# Patient Record
Sex: FEMALE | Race: WHITE | NOT HISPANIC OR LATINO
[De-identification: names, ages, dates, MRNs, and addresses within clinical notes are randomized per-mention and may not be internally consistent; named-entity substitution may affect disease eponyms.]

---

## 2017-06-02 ENCOUNTER — APPOINTMENT (OUTPATIENT)
Dept: OPHTHALMOLOGY | Facility: CLINIC | Age: 82
End: 2017-06-02

## 2019-04-05 ENCOUNTER — APPOINTMENT (OUTPATIENT)
Dept: COLORECTAL SURGERY | Facility: CLINIC | Age: 84
End: 2019-04-05

## 2019-04-08 ENCOUNTER — APPOINTMENT (OUTPATIENT)
Dept: SURGICAL ONCOLOGY | Facility: CLINIC | Age: 84
End: 2019-04-08
Payer: MEDICARE

## 2019-04-08 VITALS
DIASTOLIC BLOOD PRESSURE: 70 MMHG | SYSTOLIC BLOOD PRESSURE: 146 MMHG | OXYGEN SATURATION: 98 % | RESPIRATION RATE: 16 BRPM | HEART RATE: 87 BPM | WEIGHT: 137 LBS | BODY MASS INDEX: 25.21 KG/M2 | HEIGHT: 62 IN

## 2019-04-08 DIAGNOSIS — K63.9 DISEASE OF INTESTINE, UNSPECIFIED: ICD-10-CM

## 2019-04-08 PROCEDURE — 99205 OFFICE O/P NEW HI 60 MIN: CPT

## 2019-04-08 RX ORDER — ROSUVASTATIN CALCIUM 5 MG/1
5 TABLET, FILM COATED ORAL
Qty: 90 | Refills: 0 | Status: ACTIVE | COMMUNITY
Start: 2019-03-14

## 2019-04-08 RX ORDER — HYDROCHLOROTHIAZIDE 25 MG/1
25 TABLET ORAL
Qty: 90 | Refills: 0 | Status: ACTIVE | COMMUNITY
Start: 2019-03-01

## 2019-04-08 RX ORDER — UMECLIDINIUM BROMIDE AND VILANTEROL TRIFENATATE 62.5; 25 UG/1; UG/1
62.5-25 POWDER RESPIRATORY (INHALATION)
Qty: 60 | Refills: 0 | Status: ACTIVE | COMMUNITY
Start: 2019-03-14

## 2019-04-08 RX ORDER — LATANOPROST/PF 0.005 %
0.01 DROPS OPHTHALMIC (EYE)
Qty: 75 | Refills: 0 | Status: ACTIVE | COMMUNITY
Start: 2018-09-20

## 2019-04-08 RX ORDER — LOSARTAN POTASSIUM 50 MG/1
50 TABLET, FILM COATED ORAL
Qty: 90 | Refills: 0 | Status: ACTIVE | COMMUNITY
Start: 2019-03-14

## 2019-04-08 NOTE — ASSESSMENT
[FreeTextEntry1] : I have reviewed the patient's medical records and diagnostic images during the time of this office visit, and I have made the following recommendation:Today we discussed the planned surgery O+R+B d/w patient and daughter  \par I have reviewed the pertinent imaging.\par \par \par I have reviewed the pertinent imaging.\par

## 2019-04-08 NOTE — CONSULT LETTER
[Consult Letter:] : I had the pleasure of evaluating your patient, [unfilled]. [Please see my note below.] : Please see my note below. [Sincerely,] : Sincerely, [Dear  ___] : Dear  [unfilled], [DrXavi  ___] : Dr. LABOY [DrXavi ___] : Dr. LABOY [FreeTextEntry2] : Anton Saul [FreeTextEntry1] : 89 year old female presents today for an initial consultation.  She was recently found to be anemic and was referred for a CT of the abdomen and pelvis on 3/21/19 which revealed at least 6 cm long segment of ascending colon extending to the hepatic flexure with wall thickening and luminal narrowing concerning for mass with extracolonic spread.  A right hepatic lesion and sclerotic area in the right iliac bone may represent metastatic disease. There was gallstone with possible thickening at the fundus which may be further evaluated with ultrasound. \par \par On 4/4/19 Dr. Abhijit Solitario performed a colonoscopy which revealed a infiltrative partially obstructing mass at the hepatic flexure at least 5 cm in length.  The mass was malignant appearing.  Pathology is pending.  \par \par Her past medical history includes COPD, HTN and HLD. She reports dyspnea on exertion (able to climb a flight of stairs most of the time) but no dyspnea at rest.  Prior surgeries include a unilateral corneal transplant.  She has a family history of colon cancer involving her father diagnosed in his 70's.  Delmy has never had a screening colonoscopy prior to this point. \par \par She denies abdominal pain, hematochezia, thin caliber stools or bowel habit changes.\par Today we discussed the planned surgery O+R+B d/w patient and daughter  \par \par PCP: Dr. Jose Armando Natarajan\par Referring MD/Vascular: Dr. Anton Saul\par GI: Dr. Abhijit Solitario [FreeTextEntry3] : Salbador Odonnell MD, FACS, FASCRS\par , Department of Surgery\par Director of the [facility]\par , Minimally Invasive/Robotic Cancer Surgery, Central & Eastern Divisions\par Division of Surgical Oncology \par \par \par \par Salbador Odonnell MD, FACS, FACS, FASCRS\par , Department of Surgery\par Director of the [facility]\par , Minimally Invasive/Robotic Cancer Surgery, Central & Eastern Divisions\par Division of Surgical Oncology\par \par

## 2019-04-08 NOTE — PHYSICAL EXAM
[Normal] : supple, no neck mass and thyroid not enlarged [Normal Neck Lymph Nodes] : normal neck lymph nodes  [Normal Supraclavicular Lymph Nodes] : normal supraclavicular lymph nodes [Normal] : oriented to person, place and time, with appropriate affect

## 2019-04-08 NOTE — HISTORY OF PRESENT ILLNESS
[de-identified] : 89 year old female presents today for an initial consultation.  She was recently found to be anemic and was referred for a CT of the abdomen and pelvis on 3/21/19 which revealed at least 6 cm long segment of ascending colon extending to the hepatic flexure with wall thickening and luminal narrowing concerning for mass with extracolonic spread.  A right hepatic lesion and sclerotic area in the right iliac bone may represent metastatic disease. There was gallstone with possible thickening at the fundus which may be further evaluated with ultrasound. \par \par On 4/4/19 Dr. Abhijit Solitario performed a colonoscopy which revealed a infiltrative partially obstructing mass at the hepatic flexure at least 5 cm in length.  The mass was malignant appearing.  Pathology is pending.  \par \par Her past medical history includes COPD, HTN and HLD. She reports dyspnea on exertion (able to climb a flight of stairs most of the time) but no dyspnea at rest.  Prior surgeries include a unilateral corneal transplant.  She has a family history of colon cancer involving her father diagnosed in his 70's.  Delmy has never had a screening colonoscopy prior to this point. \par \par She denies abdominal pain, hematochezia, thin caliber stools or bowel habit changes.  \par \par PCP: Dr. Jose Armando Natarajan\par Referring MD/Vascular: Dr. Anton Saul\par GI: Dr. Abhijit Solitario

## 2019-04-15 ENCOUNTER — OUTPATIENT (OUTPATIENT)
Dept: OUTPATIENT SERVICES | Facility: HOSPITAL | Age: 84
LOS: 1 days | End: 2019-04-15
Payer: MEDICARE

## 2019-04-15 VITALS
SYSTOLIC BLOOD PRESSURE: 160 MMHG | HEART RATE: 88 BPM | RESPIRATION RATE: 14 BRPM | OXYGEN SATURATION: 96 % | WEIGHT: 138.89 LBS | TEMPERATURE: 97 F | HEIGHT: 60.5 IN | DIASTOLIC BLOOD PRESSURE: 82 MMHG

## 2019-04-15 DIAGNOSIS — K63.9 DISEASE OF INTESTINE, UNSPECIFIED: ICD-10-CM

## 2019-04-15 DIAGNOSIS — Z98.89 OTHER SPECIFIED POSTPROCEDURAL STATES: Chronic | ICD-10-CM

## 2019-04-15 DIAGNOSIS — I10 ESSENTIAL (PRIMARY) HYPERTENSION: ICD-10-CM

## 2019-04-15 DIAGNOSIS — Z87.09 PERSONAL HISTORY OF OTHER DISEASES OF THE RESPIRATORY SYSTEM: ICD-10-CM

## 2019-04-15 LAB
ALBUMIN SERPL ELPH-MCNC: 4 G/DL — SIGNIFICANT CHANGE UP (ref 3.3–5)
ALP SERPL-CCNC: 71 U/L — SIGNIFICANT CHANGE UP (ref 40–120)
ALT FLD-CCNC: 15 U/L — SIGNIFICANT CHANGE UP (ref 4–33)
ANION GAP SERPL CALC-SCNC: 13 MMO/L — SIGNIFICANT CHANGE UP (ref 7–14)
APTT BLD: 57 SEC — HIGH (ref 27.5–36.3)
AST SERPL-CCNC: 19 U/L — SIGNIFICANT CHANGE UP (ref 4–32)
BILIRUB SERPL-MCNC: 0.6 MG/DL — SIGNIFICANT CHANGE UP (ref 0.2–1.2)
BLD GP AB SCN SERPL QL: NEGATIVE — SIGNIFICANT CHANGE UP
BUN SERPL-MCNC: 31 MG/DL — HIGH (ref 7–23)
CALCIUM SERPL-MCNC: 9.9 MG/DL — SIGNIFICANT CHANGE UP (ref 8.4–10.5)
CHLORIDE SERPL-SCNC: 105 MMOL/L — SIGNIFICANT CHANGE UP (ref 98–107)
CO2 SERPL-SCNC: 23 MMOL/L — SIGNIFICANT CHANGE UP (ref 22–31)
CREAT SERPL-MCNC: 0.84 MG/DL — SIGNIFICANT CHANGE UP (ref 0.5–1.3)
GLUCOSE SERPL-MCNC: 85 MG/DL — SIGNIFICANT CHANGE UP (ref 70–99)
HCT VFR BLD CALC: 28.7 % — LOW (ref 34.5–45)
HGB BLD-MCNC: 7.9 G/DL — LOW (ref 11.5–15.5)
INR BLD: 1.08 — SIGNIFICANT CHANGE UP (ref 0.88–1.17)
MCHC RBC-ENTMCNC: 18.3 PG — LOW (ref 27–34)
MCHC RBC-ENTMCNC: 27.5 % — LOW (ref 32–36)
MCV RBC AUTO: 66.6 FL — LOW (ref 80–100)
NRBC # FLD: 0 K/UL — SIGNIFICANT CHANGE UP (ref 0–0)
PLATELET # BLD AUTO: 320 K/UL — SIGNIFICANT CHANGE UP (ref 150–400)
PMV BLD: 11 FL — SIGNIFICANT CHANGE UP (ref 7–13)
POTASSIUM SERPL-MCNC: 4.5 MMOL/L — SIGNIFICANT CHANGE UP (ref 3.5–5.3)
POTASSIUM SERPL-SCNC: 4.5 MMOL/L — SIGNIFICANT CHANGE UP (ref 3.5–5.3)
PROT SERPL-MCNC: 6.6 G/DL — SIGNIFICANT CHANGE UP (ref 6–8.3)
PROTHROM AB SERPL-ACNC: 12 SEC — SIGNIFICANT CHANGE UP (ref 9.8–13.1)
RBC # BLD: 4.31 M/UL — SIGNIFICANT CHANGE UP (ref 3.8–5.2)
RBC # FLD: 20.3 % — HIGH (ref 10.3–14.5)
RH IG SCN BLD-IMP: POSITIVE — SIGNIFICANT CHANGE UP
SODIUM SERPL-SCNC: 141 MMOL/L — SIGNIFICANT CHANGE UP (ref 135–145)
WBC # BLD: 8.06 K/UL — SIGNIFICANT CHANGE UP (ref 3.8–10.5)
WBC # FLD AUTO: 8.06 K/UL — SIGNIFICANT CHANGE UP (ref 3.8–10.5)

## 2019-04-15 PROCEDURE — 93010 ELECTROCARDIOGRAM REPORT: CPT

## 2019-04-15 NOTE — H&P PST ADULT - NSICDXPASTSURGICALHX_GEN_ALL_CORE_FT
PAST SURGICAL HISTORY:  Bilateral cataracts     H/O blepharoplasty BILATERAL    S/P eye surgery s/p corneal transplant, right

## 2019-04-15 NOTE — H&P PST ADULT - HISTORY OF PRESENT ILLNESS
89 y/o female with COPD, HTN, HTN and Glaucoma presents to PST for preoperative evaluation with dx of disease of intestine. Pt presented to her PCP with anemia. She had a CT abdomen/ pelvis followed by a colonoscopy which confirmed obstructing colon mass and right hepatic lesion. Scheduled for Laparotomy Extended Right Colectomy, Possible Liver Resection or Radiofrequency Ablation on 4/23/2019. Pt reports bloating but denies abdominal pain at present.

## 2019-04-15 NOTE — H&P PST ADULT - NEGATIVE ENMT SYMPTOMS
no dysphagia/no vertigo/no ear pain/no gum bleeding/no nasal congestion/no post-nasal discharge/no tinnitus/no nose bleeds

## 2019-04-15 NOTE — H&P PST ADULT - NS MD HP INPLANTS MED DEV
Hearing aid/bilateral hearing aids, dental implants bilateral hearing aids, dental implants/Lens implant/Hearing aid

## 2019-04-15 NOTE — H&P PST ADULT - NSANTHOSAYNRD_GEN_A_CORE
No. KJ screening performed.  STOP BANG Legend: 0-2 = LOW Risk; 3-4 = INTERMEDIATE Risk; 5-8 = HIGH Risk

## 2019-04-15 NOTE — H&P PST ADULT - RS GEN PE MLT RESP DETAILS PC
respirations non-labored/airway patent/breath sounds equal/no chest wall tenderness/good air movement/clear to auscultation bilaterally

## 2019-04-15 NOTE — H&P PST ADULT - NSICDXPASTMEDICALHX_GEN_ALL_CORE_FT
PAST MEDICAL HISTORY:  Anemia     COPD (chronic obstructive pulmonary disease)     Disease of intestine     Glaucoma     Hearing deficit, bilateral     Hepatic lesion     HTN (hypertension)     Hyperlipidemia

## 2019-04-15 NOTE — H&P PST ADULT - NEGATIVE NEUROLOGICAL SYMPTOMS
no vertigo/no confusion/no headache/no focal seizures/no transient paralysis/no loss of consciousness/no weakness/no hemiparesis/no facial palsy/no generalized seizures/no tremors/no loss of sensation/no syncope

## 2019-04-15 NOTE — H&P PST ADULT - NSICDXPROBLEM_GEN_ALL_CORE_FT
PROBLEM DIAGNOSES  Problem: Disease of intestine  Assessment and Plan: Scheduled for Laparotomy Extended Right Colectomy, Possible Liver Resection or Radiofrequency Ablation on 4/23/2019.  Preop instructions given, pt verbalized understanding   Chlorhexidine wash and GI prophylaxis provided   Pt was instructed by her surgeon to obtain medical clearance preop   She will see her PCP today at 2:45pm  PST labs and EKG to be faxed to office    Problem: HTN (hypertension)  Assessment and Plan: Repeat /70  Pt to take Losartan AM of surgery with sips of water. She will hold HCTZ on day of surgery   Medical clearance pending     Problem: History of COPD  Assessment and Plan: Pt to use Anoro Inhaler on day of surgery for management of COPD   Copy of clearance requested in PST due to h/o COPD

## 2019-04-15 NOTE — H&P PST ADULT - NEGATIVE OPHTHALMOLOGIC SYMPTOMS
no photophobia/no lacrimation R/no pain R/no blurred vision L/no pain L/no lacrimation L/no blurred vision R/no diplopia

## 2019-04-17 ENCOUNTER — RX RENEWAL (OUTPATIENT)
Age: 84
End: 2019-04-17

## 2019-04-17 RX ORDER — SODIUM PICOSULFATE, MAGNESIUM OXIDE, AND ANHYDROUS CITRIC ACID 10; 3.5; 12 MG/160ML; G/160ML; G/160ML
10-3.5-12 MG-GM LIQUID ORAL
Qty: 1 | Refills: 0 | Status: ACTIVE | COMMUNITY
Start: 2019-04-17 | End: 1900-01-01

## 2019-04-18 ENCOUNTER — OUTPATIENT (OUTPATIENT)
Dept: OUTPATIENT SERVICES | Facility: HOSPITAL | Age: 84
LOS: 1 days | End: 2019-04-18
Payer: MEDICARE

## 2019-04-18 ENCOUNTER — OUTPATIENT (OUTPATIENT)
Dept: OUTPATIENT SERVICES | Facility: HOSPITAL | Age: 84
LOS: 1 days | Discharge: HOME | End: 2019-04-18

## 2019-04-18 ENCOUNTER — RESULT REVIEW (OUTPATIENT)
Age: 84
End: 2019-04-18

## 2019-04-18 ENCOUNTER — APPOINTMENT (OUTPATIENT)
Dept: HEMATOLOGY ONCOLOGY | Facility: CLINIC | Age: 84
End: 2019-04-18

## 2019-04-18 DIAGNOSIS — D64.9 ANEMIA, UNSPECIFIED: ICD-10-CM

## 2019-04-18 DIAGNOSIS — C18.9 MALIGNANT NEOPLASM OF COLON, UNSPECIFIED: ICD-10-CM

## 2019-04-18 DIAGNOSIS — Z98.89 OTHER SPECIFIED POSTPROCEDURAL STATES: Chronic | ICD-10-CM

## 2019-04-18 PROBLEM — K76.9 LIVER DISEASE, UNSPECIFIED: Chronic | Status: ACTIVE | Noted: 2019-04-15

## 2019-04-18 PROBLEM — H91.93 UNSPECIFIED HEARING LOSS, BILATERAL: Chronic | Status: ACTIVE | Noted: 2019-04-15

## 2019-04-18 PROBLEM — K63.9 DISEASE OF INTESTINE, UNSPECIFIED: Chronic | Status: ACTIVE | Noted: 2019-04-15

## 2019-04-18 LAB
BLD GP AB SCN SERPL QL: NEGATIVE — SIGNIFICANT CHANGE UP
HCT VFR BLD CALC: 27.3 % — LOW (ref 34.5–45)
HGB BLD-MCNC: 8.7 G/DL — LOW (ref 11.5–15.5)
MCHC RBC-ENTMCNC: 20.1 PG — LOW (ref 27–34)
MCHC RBC-ENTMCNC: 31.8 G/DL — LOW (ref 32–36)
MCV RBC AUTO: 63.1 FL — LOW (ref 80–100)
PLATELET # BLD AUTO: 326 K/UL — SIGNIFICANT CHANGE UP (ref 150–400)
RBC # BLD: 4.32 M/UL — SIGNIFICANT CHANGE UP (ref 3.8–5.2)
RBC # FLD: 17.6 % — HIGH (ref 10.3–14.5)
RH IG SCN BLD-IMP: POSITIVE — SIGNIFICANT CHANGE UP
RH IG SCN BLD-IMP: POSITIVE — SIGNIFICANT CHANGE UP
WBC # BLD: 11.3 K/UL — HIGH (ref 3.8–10.5)
WBC # FLD AUTO: 11.3 K/UL — HIGH (ref 3.8–10.5)

## 2019-04-19 ENCOUNTER — OUTPATIENT (OUTPATIENT)
Dept: OUTPATIENT SERVICES | Facility: HOSPITAL | Age: 84
LOS: 1 days | End: 2019-04-19
Payer: MEDICARE

## 2019-04-19 ENCOUNTER — RESULT REVIEW (OUTPATIENT)
Age: 84
End: 2019-04-19

## 2019-04-19 DIAGNOSIS — Z98.89 OTHER SPECIFIED POSTPROCEDURAL STATES: Chronic | ICD-10-CM

## 2019-04-19 DIAGNOSIS — K63.9 DISEASE OF INTESTINE, UNSPECIFIED: ICD-10-CM

## 2019-04-19 LAB — SURGICAL PATHOLOGY STUDY: SIGNIFICANT CHANGE UP

## 2019-04-19 PROCEDURE — 86923 COMPATIBILITY TEST ELECTRIC: CPT

## 2019-04-19 PROCEDURE — 86850 RBC ANTIBODY SCREEN: CPT

## 2019-04-19 PROCEDURE — 88321 CONSLTJ&REPRT SLD PREP ELSWR: CPT

## 2019-04-19 PROCEDURE — 86900 BLOOD TYPING SEROLOGIC ABO: CPT

## 2019-04-19 PROCEDURE — 86901 BLOOD TYPING SEROLOGIC RH(D): CPT

## 2019-04-20 ENCOUNTER — APPOINTMENT (OUTPATIENT)
Age: 84
End: 2019-04-20

## 2019-04-22 ENCOUNTER — TRANSCRIPTION ENCOUNTER (OUTPATIENT)
Age: 84
End: 2019-04-22

## 2019-04-22 DIAGNOSIS — Z51.89 ENCOUNTER FOR OTHER SPECIFIED AFTERCARE: ICD-10-CM

## 2019-04-22 NOTE — ASU PATIENT PROFILE, ADULT - PSH
Bilateral cataracts    H/O blepharoplasty  BILATERAL  S/P eye surgery  s/p corneal transplant, right

## 2019-04-22 NOTE — ASU PATIENT PROFILE, ADULT - PMH
Anemia    COPD (chronic obstructive pulmonary disease)    Disease of intestine    Glaucoma    Hearing deficit, bilateral    Hepatic lesion    HTN (hypertension)    Hyperlipidemia

## 2019-04-23 ENCOUNTER — INPATIENT (INPATIENT)
Facility: HOSPITAL | Age: 84
LOS: 7 days | Discharge: HOME HEALTH | End: 2019-05-01
Attending: SPECIALIST | Admitting: SPECIALIST
Payer: MEDICARE

## 2019-04-23 ENCOUNTER — APPOINTMENT (OUTPATIENT)
Dept: SURGICAL ONCOLOGY | Facility: HOSPITAL | Age: 84
End: 2019-04-23

## 2019-04-23 ENCOUNTER — RESULT REVIEW (OUTPATIENT)
Age: 84
End: 2019-04-23

## 2019-04-23 VITALS
RESPIRATION RATE: 14 BRPM | SYSTOLIC BLOOD PRESSURE: 157 MMHG | OXYGEN SATURATION: 98 % | WEIGHT: 138.89 LBS | DIASTOLIC BLOOD PRESSURE: 59 MMHG | HEART RATE: 81 BPM | HEIGHT: 60.5 IN | TEMPERATURE: 99 F

## 2019-04-23 DIAGNOSIS — D64.9 ANEMIA, UNSPECIFIED: ICD-10-CM

## 2019-04-23 DIAGNOSIS — K76.9 LIVER DISEASE, UNSPECIFIED: ICD-10-CM

## 2019-04-23 DIAGNOSIS — K63.9 DISEASE OF INTESTINE, UNSPECIFIED: ICD-10-CM

## 2019-04-23 DIAGNOSIS — E78.5 HYPERLIPIDEMIA, UNSPECIFIED: ICD-10-CM

## 2019-04-23 DIAGNOSIS — Z98.89 OTHER SPECIFIED POSTPROCEDURAL STATES: Chronic | ICD-10-CM

## 2019-04-23 LAB
ALBUMIN SERPL ELPH-MCNC: 3.8 G/DL — SIGNIFICANT CHANGE UP (ref 3.3–5)
ALP SERPL-CCNC: 69 U/L — SIGNIFICANT CHANGE UP (ref 40–120)
ALT FLD-CCNC: 53 U/L — HIGH (ref 4–33)
ANION GAP SERPL CALC-SCNC: 18 MMO/L — HIGH (ref 7–14)
AST SERPL-CCNC: 120 U/L — HIGH (ref 4–32)
BASE EXCESS BLDA CALC-SCNC: -1.2 MMOL/L — SIGNIFICANT CHANGE UP
BILIRUB DIRECT SERPL-MCNC: 0.3 MG/DL — HIGH (ref 0.1–0.2)
BILIRUB SERPL-MCNC: 1.2 MG/DL — SIGNIFICANT CHANGE UP (ref 0.2–1.2)
BLD GP AB SCN SERPL QL: NEGATIVE — SIGNIFICANT CHANGE UP
BUN SERPL-MCNC: 20 MG/DL — SIGNIFICANT CHANGE UP (ref 7–23)
CA-I BLDA-SCNC: 1.21 MMOL/L — SIGNIFICANT CHANGE UP (ref 1.15–1.29)
CALCIUM SERPL-MCNC: 9.4 MG/DL — SIGNIFICANT CHANGE UP (ref 8.4–10.5)
CHLORIDE SERPL-SCNC: 103 MMOL/L — SIGNIFICANT CHANGE UP (ref 98–107)
CO2 SERPL-SCNC: 21 MMOL/L — LOW (ref 22–31)
CREAT SERPL-MCNC: 0.83 MG/DL — SIGNIFICANT CHANGE UP (ref 0.5–1.3)
GLUCOSE BLDA-MCNC: 103 MG/DL — HIGH (ref 70–99)
GLUCOSE BLDC GLUCOMTR-MCNC: 84 MG/DL — SIGNIFICANT CHANGE UP (ref 70–99)
GLUCOSE SERPL-MCNC: 104 MG/DL — HIGH (ref 70–99)
HCO3 BLDA-SCNC: 23 MMOL/L — SIGNIFICANT CHANGE UP (ref 22–26)
HCT VFR BLD CALC: 31.7 % — LOW (ref 34.5–45)
HCT VFR BLDA CALC: 25.5 % — LOW (ref 34.5–46.5)
HGB BLD-MCNC: 9.2 G/DL — LOW (ref 11.5–15.5)
HGB BLDA-MCNC: 8.2 G/DL — LOW (ref 11.5–15.5)
MAGNESIUM SERPL-MCNC: 1.9 MG/DL — SIGNIFICANT CHANGE UP (ref 1.6–2.6)
MCHC RBC-ENTMCNC: 19.9 PG — LOW (ref 27–34)
MCHC RBC-ENTMCNC: 29 % — LOW (ref 32–36)
MCV RBC AUTO: 68.5 FL — LOW (ref 80–100)
NRBC # FLD: 0 K/UL — SIGNIFICANT CHANGE UP (ref 0–0)
PCO2 BLDA: 41 MMHG — SIGNIFICANT CHANGE UP (ref 32–48)
PH BLDA: 7.37 PH — SIGNIFICANT CHANGE UP (ref 7.35–7.45)
PHOSPHATE SERPL-MCNC: 3.5 MG/DL — SIGNIFICANT CHANGE UP (ref 2.5–4.5)
PLATELET # BLD AUTO: 279 K/UL — SIGNIFICANT CHANGE UP (ref 150–400)
PMV BLD: 10.3 FL — SIGNIFICANT CHANGE UP (ref 7–13)
PO2 BLDA: 234 MMHG — HIGH (ref 83–108)
POTASSIUM BLDA-SCNC: 3.7 MMOL/L — SIGNIFICANT CHANGE UP (ref 3.4–4.5)
POTASSIUM SERPL-MCNC: 3.8 MMOL/L — SIGNIFICANT CHANGE UP (ref 3.5–5.3)
POTASSIUM SERPL-SCNC: 3.8 MMOL/L — SIGNIFICANT CHANGE UP (ref 3.5–5.3)
PROT SERPL-MCNC: 6 G/DL — SIGNIFICANT CHANGE UP (ref 6–8.3)
RBC # BLD: 4.63 M/UL — SIGNIFICANT CHANGE UP (ref 3.8–5.2)
RBC # FLD: 22.8 % — HIGH (ref 10.3–14.5)
RH IG SCN BLD-IMP: POSITIVE — SIGNIFICANT CHANGE UP
SAO2 % BLDA: 99.7 % — HIGH (ref 95–99)
SODIUM BLDA-SCNC: 139 MMOL/L — SIGNIFICANT CHANGE UP (ref 136–146)
SODIUM SERPL-SCNC: 142 MMOL/L — SIGNIFICANT CHANGE UP (ref 135–145)
WBC # BLD: 15.62 K/UL — HIGH (ref 3.8–10.5)
WBC # FLD AUTO: 15.62 K/UL — HIGH (ref 3.8–10.5)

## 2019-04-23 PROCEDURE — 88331 PATH CONSLTJ SURG 1 BLK 1SPC: CPT | Mod: 26

## 2019-04-23 PROCEDURE — 88342 IMHCHEM/IMCYTCHM 1ST ANTB: CPT | Mod: 26

## 2019-04-23 PROCEDURE — 88307 TISSUE EXAM BY PATHOLOGIST: CPT | Mod: 26

## 2019-04-23 PROCEDURE — 47100 WEDGE BIOPSY OF LIVER: CPT

## 2019-04-23 PROCEDURE — 88309 TISSUE EXAM BY PATHOLOGIST: CPT | Mod: 26

## 2019-04-23 PROCEDURE — 51702 INSERT TEMP BLADDER CATH: CPT | Mod: 59

## 2019-04-23 PROCEDURE — 38747 REMOVE ABDOMINAL LYMPH NODES: CPT | Mod: 59

## 2019-04-23 PROCEDURE — 44140 PARTIAL REMOVAL OF COLON: CPT

## 2019-04-23 PROCEDURE — 88341 IMHCHEM/IMCYTCHM EA ADD ANTB: CPT | Mod: 26

## 2019-04-23 RX ORDER — HEPARIN SODIUM 5000 [USP'U]/ML
5000 INJECTION INTRAVENOUS; SUBCUTANEOUS EVERY 8 HOURS
Qty: 0 | Refills: 0 | Status: DISCONTINUED | OUTPATIENT
Start: 2019-04-23 | End: 2019-04-23

## 2019-04-23 RX ORDER — NALOXONE HYDROCHLORIDE 4 MG/.1ML
0.1 SPRAY NASAL
Qty: 0 | Refills: 0 | Status: DISCONTINUED | OUTPATIENT
Start: 2019-04-23 | End: 2019-04-26

## 2019-04-23 RX ORDER — ONDANSETRON 8 MG/1
4 TABLET, FILM COATED ORAL ONCE
Qty: 0 | Refills: 0 | Status: DISCONTINUED | OUTPATIENT
Start: 2019-04-23 | End: 2019-04-29

## 2019-04-23 RX ORDER — ACETAMINOPHEN 500 MG
1000 TABLET ORAL ONCE
Qty: 0 | Refills: 0 | Status: COMPLETED | OUTPATIENT
Start: 2019-04-24 | End: 2019-04-24

## 2019-04-23 RX ORDER — ROSUVASTATIN CALCIUM 5 MG/1
1 TABLET ORAL
Qty: 0 | Refills: 0 | COMMUNITY

## 2019-04-23 RX ORDER — ONDANSETRON 8 MG/1
4 TABLET, FILM COATED ORAL EVERY 6 HOURS
Qty: 0 | Refills: 0 | Status: DISCONTINUED | OUTPATIENT
Start: 2019-04-23 | End: 2019-04-26

## 2019-04-23 RX ORDER — ONDANSETRON 8 MG/1
4 TABLET, FILM COATED ORAL EVERY 6 HOURS
Qty: 0 | Refills: 0 | Status: DISCONTINUED | OUTPATIENT
Start: 2019-04-23 | End: 2019-04-29

## 2019-04-23 RX ORDER — HEPARIN SODIUM 5000 [USP'U]/ML
5000 INJECTION INTRAVENOUS; SUBCUTANEOUS EVERY 12 HOURS
Qty: 0 | Refills: 0 | Status: DISCONTINUED | OUTPATIENT
Start: 2019-04-23 | End: 2019-04-27

## 2019-04-23 RX ORDER — IPRATROPIUM/ALBUTEROL SULFATE 18-103MCG
3 AEROSOL WITH ADAPTER (GRAM) INHALATION EVERY 6 HOURS
Qty: 0 | Refills: 0 | Status: DISCONTINUED | OUTPATIENT
Start: 2019-04-23 | End: 2019-04-25

## 2019-04-23 RX ORDER — SODIUM CHLORIDE 9 MG/ML
1000 INJECTION, SOLUTION INTRAVENOUS
Qty: 0 | Refills: 0 | Status: DISCONTINUED | OUTPATIENT
Start: 2019-04-23 | End: 2019-04-23

## 2019-04-23 RX ORDER — ACETAMINOPHEN 500 MG
1000 TABLET ORAL ONCE
Qty: 0 | Refills: 0 | Status: COMPLETED | OUTPATIENT
Start: 2019-04-23 | End: 2019-04-23

## 2019-04-23 RX ORDER — LATANOPROST 0.05 MG/ML
1 SOLUTION/ DROPS OPHTHALMIC; TOPICAL
Qty: 0 | Refills: 0 | COMMUNITY

## 2019-04-23 RX ORDER — HYDROMORPHONE HYDROCHLORIDE 2 MG/ML
0.25 INJECTION INTRAMUSCULAR; INTRAVENOUS; SUBCUTANEOUS
Qty: 0 | Refills: 0 | Status: DISCONTINUED | OUTPATIENT
Start: 2019-04-23 | End: 2019-04-24

## 2019-04-23 RX ORDER — LOSARTAN POTASSIUM 100 MG/1
1 TABLET, FILM COATED ORAL
Qty: 0 | Refills: 0 | COMMUNITY

## 2019-04-23 RX ORDER — LATANOPROST 0.05 MG/ML
1 SOLUTION/ DROPS OPHTHALMIC; TOPICAL AT BEDTIME
Qty: 0 | Refills: 0 | Status: DISCONTINUED | OUTPATIENT
Start: 2019-04-23 | End: 2019-05-01

## 2019-04-23 RX ORDER — HYDROCHLOROTHIAZIDE 25 MG
25 TABLET ORAL DAILY
Qty: 0 | Refills: 0 | Status: DISCONTINUED | OUTPATIENT
Start: 2019-04-23 | End: 2019-05-01

## 2019-04-23 RX ORDER — SODIUM CHLORIDE 9 MG/ML
3 INJECTION INTRAMUSCULAR; INTRAVENOUS; SUBCUTANEOUS EVERY 8 HOURS
Qty: 0 | Refills: 0 | Status: DISCONTINUED | OUTPATIENT
Start: 2019-04-23 | End: 2019-04-23

## 2019-04-23 RX ORDER — ATORVASTATIN CALCIUM 80 MG/1
20 TABLET, FILM COATED ORAL AT BEDTIME
Qty: 0 | Refills: 0 | Status: DISCONTINUED | OUTPATIENT
Start: 2019-04-23 | End: 2019-05-01

## 2019-04-23 RX ORDER — LOSARTAN POTASSIUM 100 MG/1
50 TABLET, FILM COATED ORAL DAILY
Qty: 0 | Refills: 0 | Status: DISCONTINUED | OUTPATIENT
Start: 2019-04-23 | End: 2019-05-01

## 2019-04-23 RX ORDER — HYDROMORPHONE HYDROCHLORIDE 2 MG/ML
0.5 INJECTION INTRAMUSCULAR; INTRAVENOUS; SUBCUTANEOUS
Qty: 0 | Refills: 0 | Status: DISCONTINUED | OUTPATIENT
Start: 2019-04-23 | End: 2019-04-24

## 2019-04-23 RX ORDER — SODIUM CHLORIDE 9 MG/ML
1000 INJECTION, SOLUTION INTRAVENOUS
Qty: 0 | Refills: 0 | Status: DISCONTINUED | OUTPATIENT
Start: 2019-04-23 | End: 2019-04-24

## 2019-04-23 RX ORDER — UMECLIDINIUM BROMIDE AND VILANTEROL TRIFENATATE 62.5; 25 UG/1; UG/1
1 POWDER RESPIRATORY (INHALATION)
Qty: 0 | Refills: 0 | COMMUNITY

## 2019-04-23 RX ADMIN — HYDROMORPHONE HYDROCHLORIDE 0.5 MILLIGRAM(S): 2 INJECTION INTRAMUSCULAR; INTRAVENOUS; SUBCUTANEOUS at 18:34

## 2019-04-23 RX ADMIN — Medication 400 MILLIGRAM(S): at 22:05

## 2019-04-23 RX ADMIN — HYDROMORPHONE HYDROCHLORIDE 0.5 MILLIGRAM(S): 2 INJECTION INTRAMUSCULAR; INTRAVENOUS; SUBCUTANEOUS at 18:45

## 2019-04-23 RX ADMIN — Medication 1000 MILLIGRAM(S): at 22:50

## 2019-04-23 RX ADMIN — Medication 3 MILLILITER(S): at 22:05

## 2019-04-23 RX ADMIN — LATANOPROST 1 DROP(S): 0.05 SOLUTION/ DROPS OPHTHALMIC; TOPICAL at 22:06

## 2019-04-23 RX ADMIN — SODIUM CHLORIDE 30 MILLILITER(S): 9 INJECTION, SOLUTION INTRAVENOUS at 11:30

## 2019-04-23 RX ADMIN — ATORVASTATIN CALCIUM 20 MILLIGRAM(S): 80 TABLET, FILM COATED ORAL at 22:05

## 2019-04-23 NOTE — CHART NOTE - NSCHARTNOTEFT_GEN_A_CORE
SURGICAL POST-OP CHECK NOTE:    Procedure: Microwave ablation, mass, liver   Open resection of ascending colon extended right colectomy    Subjective: Patient feels well since surgery, endorses some abdominal bloating, pain well controlled     Vital Signs Last 24 Hrs  T(C): 36.6 (23 Apr 2019 16:30), Max: 37.1 (23 Apr 2019 10:24)  T(F): 97.8 (23 Apr 2019 16:30), Max: 98.7 (23 Apr 2019 10:24)  HR: 80 (23 Apr 2019 17:15) (73 - 81)  BP: 140/55 (23 Apr 2019 17:15) (126/52 - 157/59)  BP(mean): 72 (23 Apr 2019 17:15) (64 - 76)  RR: 20 (23 Apr 2019 17:15) (14 - 20)  SpO2: 98% (23 Apr 2019 17:15) (94% - 98%)  I&O's Summary                          9.2    15.62 )-----------( 279      ( 23 Apr 2019 18:23 )             31.7     04-23    142  |  103  |  20  ----------------------------<  104<H>  3.8   |  21<L>  |  0.83    Ca    9.4      23 Apr 2019 17:40  Phos  3.5     04-23  Mg     1.9     04-23    TPro  6.0  /  Alb  3.8  /  TBili  1.2  /  DBili  0.3<H>  /  AST  120<H>  /  ALT  53<H>  /  AlkPhos  69  04-23       PHYSICAL EXAM:  Gen: NAD  Resp: Nonlabored  Abdomen: Dressing c/d/i, abdomen soft, nt, nd      A/P: 89yoF s/p Microwave ablation, mass, liver ,Open resection of ascending colon extended right colectomy    Neuro: Pain control  Resp: IS  GI: ADAT  MSK: WBAT, PT/OT  Heme: DVT PPX

## 2019-04-23 NOTE — ASU PREOP CHECKLIST - SELECT TESTS ORDERED
Type and Screen/CBC/CMP/PT/PTT/INR/EKG fs 84  T&S sent/EKG/CMP/INR/CBC/Type and Screen/PT/PTT/POCT Blood Glucose

## 2019-04-23 NOTE — BRIEF OPERATIVE NOTE - NSICDXBRIEFPROCEDURE_GEN_ALL_CORE_FT
PROCEDURES:  Microwave ablation, mass, liver 23-Apr-2019 15:58:05  Damian Lira  Open resection of ascending colon 23-Apr-2019 15:57:25 extended right colectomy Damian Lira

## 2019-04-23 NOTE — BRIEF OPERATIVE NOTE - OPERATION/FINDINGS
midline laparotomy, mass at hepatic flexure.  Extended right colectomy with primary stapled anastomosis.  liver lesion noted at periphery in right lower lobe of liver, cystic.  Ablated with microwave.  Biopsy taken and sent for pathology.

## 2019-04-23 NOTE — PROGRESS NOTE ADULT - SUBJECTIVE AND OBJECTIVE BOX
Subjective: Patient is a 89y old  Female who presents with a chief complaint weight loss anemia i performed a colonoscopy  found irond eficency anemia obstructing tumor hepatic flexure for preop today     PAST MEDICAL & SURGICAL HISTORY:  Anemia  Hearing deficit, bilateral  Hepatic lesion  Disease of intestine  HTN (hypertension)  Hyperlipidemia  Glaucoma  COPD (chronic obstructive pulmonary disease)  H/O blepharoplasty: BILATERAL  S/P eye surgery: s/p corneal transplant, right  Bilateral cataracts      Allergies    No Known Allergies    Intolerances        MEDICATIONS  (STANDING):  lactated ringers. 1000 milliLiter(s) (30 mL/Hr) IV Continuous <Continuous>  sodium chloride 0.9% lock flush 3 milliLiter(s) IV Push every 8 hours    MEDICATIONS  (PRN):      CONSTITUTIONAL: No fever,20lb some  fatigue  EYES: No eye pain, visual disturbances, or discharge  ENMT:  No difficulty hearing, tinnitus, vertigo; No sinus or throat pain  NECK: No pain or stiffness  BREASTS: No pain, masses, or nipple discharge  RESPIRATORY: No cough, wheezing, chills or hemoptysis; No shortness of breath  CARDIOVASCULAR: No chest pain, palpitations, dizziness, or leg swelling  GASTROINTESTINAL: No abdominal or epigastric pain. No nausea, vomiting, or hematemesis; No diarrhea or constipation. No melena or hematochezia.  GENITOURINARY: No dysuria, frequency, hematuria, or incontinence  NEUROLOGICAL: No headaches, memory loss, loss of strength, numbness, or tremors  SKIN: No itching, burning, rashes, or lesions   LYMPH NODES: No enlarged glands  ENDOCRINE: No heat or cold intolerance; No hair loss  MUSCULOSKELETAL: No joint pain or swelling; No muscle, back, or extremity pain  PSYCHIATRIC: No depression, anxiety, mood swings, or difficulty sleeping  HEME/LYMPH: No easy bruising, or bleeding gums  ALLERY AND IMMUNOLOGIC: No hives or eczema      PHYSICAL EXAM:    GENERAL: Comfortable, no acute distress pale   HEAD:  Normocephalic, atraumatic  EYES: EOMI, PERRLA  HEENT: Moist mucous membranes  NECK: Supple, No JVD  NERVOUS SYSTEM:  Alert & Oriented X3, Motor Strength 5/5 B/L upper and lower extremities  CHEST/LUNG: Clear to auscultation bilaterally  HEART: Regular rate and rhythm  ABDOMEN: Soft, Nontender,distended, Bowel sounds present  GENITOURINARY: Voiding, no palpable bladder  EXTREMITIES:   No clubbing, cyanosis, or edema  MUSCULOSKELTAL- No muscle tenderness, no joint tenderness  SKIN-no rash    guiac positive stools  p88         132/80 afebrile     cea 31  low iron 13   folate nl  iron deficiency anemia                 EKG:    Imaging Studies:    Impression / Plan: colon cancer anemia obstruction  for surgery today

## 2019-04-24 LAB
ALBUMIN SERPL ELPH-MCNC: 3.4 G/DL — SIGNIFICANT CHANGE UP (ref 3.3–5)
ALP SERPL-CCNC: 61 U/L — SIGNIFICANT CHANGE UP (ref 40–120)
ALT FLD-CCNC: 81 U/L — HIGH (ref 4–33)
ANION GAP SERPL CALC-SCNC: 18 MMO/L — HIGH (ref 7–14)
APTT BLD: 49.5 SEC — HIGH (ref 27.5–36.3)
AST SERPL-CCNC: 122 U/L — HIGH (ref 4–32)
BILIRUB DIRECT SERPL-MCNC: 0.2 MG/DL — SIGNIFICANT CHANGE UP (ref 0.1–0.2)
BILIRUB SERPL-MCNC: 0.9 MG/DL — SIGNIFICANT CHANGE UP (ref 0.2–1.2)
BUN SERPL-MCNC: 24 MG/DL — HIGH (ref 7–23)
CALCIUM SERPL-MCNC: 9.4 MG/DL — SIGNIFICANT CHANGE UP (ref 8.4–10.5)
CHLORIDE SERPL-SCNC: 104 MMOL/L — SIGNIFICANT CHANGE UP (ref 98–107)
CO2 SERPL-SCNC: 20 MMOL/L — LOW (ref 22–31)
CREAT SERPL-MCNC: 0.86 MG/DL — SIGNIFICANT CHANGE UP (ref 0.5–1.3)
GLUCOSE SERPL-MCNC: 146 MG/DL — HIGH (ref 70–99)
HCT VFR BLD CALC: 30.6 % — LOW (ref 34.5–45)
HGB BLD-MCNC: 8.7 G/DL — LOW (ref 11.5–15.5)
INR BLD: 1.22 — HIGH (ref 0.88–1.17)
MAGNESIUM SERPL-MCNC: 1.9 MG/DL — SIGNIFICANT CHANGE UP (ref 1.6–2.6)
MCHC RBC-ENTMCNC: 19.7 PG — LOW (ref 27–34)
MCHC RBC-ENTMCNC: 28.4 % — LOW (ref 32–36)
MCV RBC AUTO: 69.4 FL — LOW (ref 80–100)
NRBC # FLD: 0 K/UL — SIGNIFICANT CHANGE UP (ref 0–0)
PHOSPHATE SERPL-MCNC: 4.2 MG/DL — SIGNIFICANT CHANGE UP (ref 2.5–4.5)
PLATELET # BLD AUTO: 277 K/UL — SIGNIFICANT CHANGE UP (ref 150–400)
PMV BLD: 11.1 FL — SIGNIFICANT CHANGE UP (ref 7–13)
POTASSIUM SERPL-MCNC: 3.9 MMOL/L — SIGNIFICANT CHANGE UP (ref 3.5–5.3)
POTASSIUM SERPL-SCNC: 3.9 MMOL/L — SIGNIFICANT CHANGE UP (ref 3.5–5.3)
PROT SERPL-MCNC: 5.4 G/DL — LOW (ref 6–8.3)
PROTHROM AB SERPL-ACNC: 14 SEC — HIGH (ref 9.8–13.1)
RBC # BLD: 4.41 M/UL — SIGNIFICANT CHANGE UP (ref 3.8–5.2)
RBC # FLD: 22.6 % — HIGH (ref 10.3–14.5)
SODIUM SERPL-SCNC: 142 MMOL/L — SIGNIFICANT CHANGE UP (ref 135–145)
WBC # BLD: 18.91 K/UL — HIGH (ref 3.8–10.5)
WBC # FLD AUTO: 18.91 K/UL — HIGH (ref 3.8–10.5)

## 2019-04-24 RX ORDER — MAGNESIUM SULFATE 500 MG/ML
1 VIAL (ML) INJECTION ONCE
Qty: 0 | Refills: 0 | Status: COMPLETED | OUTPATIENT
Start: 2019-04-24 | End: 2019-04-24

## 2019-04-24 RX ORDER — DEXTROSE MONOHYDRATE, SODIUM CHLORIDE, AND POTASSIUM CHLORIDE 50; .745; 4.5 G/1000ML; G/1000ML; G/1000ML
1000 INJECTION, SOLUTION INTRAVENOUS
Qty: 0 | Refills: 0 | Status: DISCONTINUED | OUTPATIENT
Start: 2019-04-24 | End: 2019-04-29

## 2019-04-24 RX ORDER — HYDROMORPHONE HYDROCHLORIDE 2 MG/ML
0.5 INJECTION INTRAMUSCULAR; INTRAVENOUS; SUBCUTANEOUS
Qty: 0 | Refills: 0 | Status: DISCONTINUED | OUTPATIENT
Start: 2019-04-24 | End: 2019-04-26

## 2019-04-24 RX ORDER — SODIUM CHLORIDE 9 MG/ML
1000 INJECTION, SOLUTION INTRAVENOUS
Qty: 0 | Refills: 0 | Status: DISCONTINUED | OUTPATIENT
Start: 2019-04-24 | End: 2019-04-24

## 2019-04-24 RX ORDER — POTASSIUM CHLORIDE 20 MEQ
10 PACKET (EA) ORAL ONCE
Qty: 0 | Refills: 0 | Status: DISCONTINUED | OUTPATIENT
Start: 2019-04-24 | End: 2019-04-24

## 2019-04-24 RX ADMIN — Medication 100 GRAM(S): at 09:49

## 2019-04-24 RX ADMIN — HEPARIN SODIUM 5000 UNIT(S): 5000 INJECTION INTRAVENOUS; SUBCUTANEOUS at 18:10

## 2019-04-24 RX ADMIN — ONDANSETRON 4 MILLIGRAM(S): 8 TABLET, FILM COATED ORAL at 06:30

## 2019-04-24 RX ADMIN — DEXTROSE MONOHYDRATE, SODIUM CHLORIDE, AND POTASSIUM CHLORIDE 50 MILLILITER(S): 50; .745; 4.5 INJECTION, SOLUTION INTRAVENOUS at 10:06

## 2019-04-24 RX ADMIN — Medication 3 MILLILITER(S): at 09:33

## 2019-04-24 RX ADMIN — Medication 400 MILLIGRAM(S): at 05:05

## 2019-04-24 RX ADMIN — ATORVASTATIN CALCIUM 20 MILLIGRAM(S): 80 TABLET, FILM COATED ORAL at 21:06

## 2019-04-24 RX ADMIN — Medication 3 MILLILITER(S): at 04:28

## 2019-04-24 RX ADMIN — LOSARTAN POTASSIUM 50 MILLIGRAM(S): 100 TABLET, FILM COATED ORAL at 05:04

## 2019-04-24 RX ADMIN — HEPARIN SODIUM 5000 UNIT(S): 5000 INJECTION INTRAVENOUS; SUBCUTANEOUS at 05:05

## 2019-04-24 RX ADMIN — Medication 25 MILLIGRAM(S): at 05:04

## 2019-04-24 RX ADMIN — Medication 3 MILLILITER(S): at 21:20

## 2019-04-24 RX ADMIN — LATANOPROST 1 DROP(S): 0.05 SOLUTION/ DROPS OPHTHALMIC; TOPICAL at 21:06

## 2019-04-24 RX ADMIN — Medication 3 MILLILITER(S): at 16:24

## 2019-04-24 NOTE — PHYSICAL THERAPY INITIAL EVALUATION ADULT - PERTINENT HX OF CURRENT PROBLEM, REHAB EVAL
This is a 89F with colon cancer s/p extended right colectomy with primary stapled anastomosis and microwave ablation of liver lesion (4/23)

## 2019-04-24 NOTE — CONSULT NOTE ADULT - SUBJECTIVE AND OBJECTIVE BOX
89 y/o female with COPD, HTN, HTN and Glaucoma s/p colectomy w/ anastamosis an liver ablation       INTERVAL HPI/OVERNIGHT EVENTS:    Medications:MEDICATIONS  (STANDING):  ALBUTerol/ipratropium for Nebulization 3 milliLiter(s) Nebulizer every 6 hours  atorvastatin 20 milliGRAM(s) Oral at bedtime  dextrose 5% + sodium chloride 0.9% with potassium chloride 20 mEq/L 1000 milliLiter(s) (50 mL/Hr) IV Continuous <Continuous>  heparin  Injectable 5000 Unit(s) SubCutaneous every 12 hours  hydrochlorothiazide 25 milliGRAM(s) Oral daily  HYDROmorphone (10 MICROgram(s)/mL) + BUpivacaine 0.0625% in 0.9% Sodium Chloride PCEA 250 milliLiter(s) Epidural PCA Continuous  latanoprost 0.005% Ophthalmic Solution 1 Drop(s) Both EYES at bedtime  losartan 50 milliGRAM(s) Oral daily    MEDICATIONS  (PRN):  HYDROmorphone  Injectable 0.5 milliGRAM(s) IV Push every 3 hours PRN severe breakthrough pain  HYDROmorphone (10 MICROgram(s)/mL) + BUpivacaine 0.0625% in 0.9% Sodium Chloride PCEA Rescue Clinician  Bolus 4 milliLiter(s) Epidural every 15 minutes PRN for Pain Score greater than 6  naloxone Injectable 0.1 milliGRAM(s) IV Push every 3 minutes PRN For ANY of the following changes in patient status:  A. RR LESS THAN 10 breaths per minute, B. Oxygen saturation LESS THAN 90%, C. Sedation score of 6  naloxone Injectable 0.1 milliGRAM(s) IV Push every 3 minutes PRN For ANY of the following changes in patient status:  A. RR LESS THAN 10 breaths per minute, B. Oxygen saturation LESS THAN 90%, C. Sedation score of 6  ondansetron Injectable 4 milliGRAM(s) IV Push every 6 hours PRN Nausea  ondansetron Injectable 4 milliGRAM(s) IV Push once PRN Nausea and/or Vomiting  ondansetron Injectable 4 milliGRAM(s) IV Push every 6 hours PRN Nausea      Allergies: Allergies    No Known Allergies    Intolerances          FAMILY HISTORY:  FH: colon cancer: father        PAST MEDICAL & SURGICAL HISTORY:  Anemia  Hearing deficit, bilateral  Hepatic lesion  Disease of intestine  HTN (hypertension)  Hyperlipidemia  Glaucoma  COPD (chronic obstructive pulmonary disease)  H/O blepharoplasty: BILATERAL  S/P eye surgery: s/p corneal transplant, right  Bilateral cataracts      REVIEW OF SYSTEMS:  CONSTITUTIONAL: No fever, weight loss, or fatigue  EYES: No eye pain, visual disturbances, or discharge  ENMT:  No difficulty hearing, tinnitus, vertigo; No sinus or throat pain  NECK: No pain or stiffness  RESPIRATORY: No cough, wheezing, chills or hemoptysis; No shortness of breath  CARDIOVASCULAR: No chest pain, palpitations, dizziness, or leg swelling  GASTROINTESTINAL:abd pain better   GENITOURINARY: No dysuria, frequency, hematuria, or incontinence  NEUROLOGICAL: No headaches, memory loss, loss of strength, numbness, or tremors  SKIN: No itching, burning, rashes, or lesions         T(C): 37.1 (04-24-19 @ 08:36), Max: 37.1 (04-24-19 @ 08:36)  HR: 88 (04-24-19 @ 09:33) (73 - 98)  BP: 123/39 (04-24-19 @ 08:36) (114/50 - 151/58)  RR: 18 (04-24-19 @ 08:36) (12 - 20)  SpO2: 95% (04-24-19 @ 08:36) (94% - 100%)  Wt(kg): --Vital Signs Last 24 Hrs  T(C): 37.1 (24 Apr 2019 08:36), Max: 37.1 (24 Apr 2019 08:36)  T(F): 98.7 (24 Apr 2019 08:36), Max: 98.7 (24 Apr 2019 08:36)  HR: 88 (24 Apr 2019 09:33) (73 - 98)  BP: 123/39 (24 Apr 2019 08:36) (114/50 - 151/58)  BP(mean): 59 (24 Apr 2019 08:36) (59 - 82)  RR: 18 (24 Apr 2019 08:36) (12 - 20)  SpO2: 95% (24 Apr 2019 08:36) (94% - 100%)  I&O's Summary    23 Apr 2019 07:01  -  24 Apr 2019 07:00  --------------------------------------------------------  IN: 1300 mL / OUT: 835 mL / NET: 465 mL        PHYSICAL EXAM:  GENERAL: NAD, well-groomed, well-developed  HEAD:  Atraumatic, Normocephalic  EYES: EOMI, PERRLA, conjunctiva and sclera clear  ENMT: No tonsillar erythema, exudates, or enlargement; Moist mucous membranes, Good dentition, No lesions  NECK: Supple, No JVD, Normal thyroid  NERVOUS SYSTEM:  Alert & Oriented X3, ; Motor Strength 5/5 B/L upper and lower extremities; DTRs 2+ intact and symmetric  CHEST/LUNG: Clear to percussion bilaterally; No rales, rhonchi, wheezing, or rubs  HEART: Regular rate and rhythm; No murmurs, rubs, or gallops  ABDOMEN: Soft, tender no r/g  EXTREMITIES:  2+ Peripheral Pulses, No clubbing, cyanosis, or edema    Consultant(s) Notes Reviewed:  [x ] YES  [ ] NO  Care Discussed with Consultants/Other Providerscpk [ x] YES  [ ] NO    LABS:                    CBC Full  -  ( 24 Apr 2019 05:11 )  WBC Count : 18.91 K/uL  RBC Count : 4.41 M/uL  Hemoglobin : 8.7 g/dL  Hematocrit : 30.6 %  Platelet Count - Automated : 277 K/uL  Mean Cell Volume : 69.4 fL  Mean Cell Hemoglobin : 19.7 pg  Mean Cell Hemoglobin Concentration : 28.4 %  Auto Neutrophil # : x  Auto Lymphocyte # : x  Auto Monocyte # : x  Auto Eosinophil # : x  Auto Basophil # : x  Auto Neutrophil % : x  Auto Lymphocyte % : x  Auto Monocyte % : x  Auto Eosinophil % : x  Auto Basophil % : x      04-24    142  |  104  |  24<H>  ----------------------------<  146<H>  3.9   |  20<L>  |  0.86    Ca    9.4      24 Apr 2019 05:11  Phos  4.2     04-24  Mg     1.9     04-24    TPro  5.4<L>  /  Alb  3.4  /  TBili  0.9  /  DBili  0.2  /  AST  122<H>  /  ALT  81<H>  /  AlkPhos  61  04-24          PT/INR - ( 24 Apr 2019 08:58 )   PT: 14.0 SEC;   INR: 1.22          PTT - ( 24 Apr 2019 08:58 )  PTT:49.5 SEC  RADIOLOGY & ADDITIONAL TESTS:    Imaging Personally Reviewed:  [ ] YES  [ ] NO

## 2019-04-24 NOTE — PATIENT PROFILE ADULT - SURGICAL SITE INCISION
----- Message from Ck Kruger sent at 5/16/2018  9:44 AM CDT -----  Pt is requesting a call from nurse to discuss which hospital  is working at.        Please call pt back at [phone]   
Pt advised that Dr. Hernandez does not make rounds at the [facility]  
yes

## 2019-04-24 NOTE — PHYSICAL THERAPY INITIAL EVALUATION ADULT - GENERAL OBSERVATIONS, REHAB EVAL
Patient received semi supine in bed , (+) IV, (+) PCA pump , (+) khan, (+) hearing aid, (+) hard of hearing ,(+) 2LO2 via nasal cannula , daughter at bedside.

## 2019-04-24 NOTE — PROGRESS NOTE ADULT - SUBJECTIVE AND OBJECTIVE BOX
Surgery Progress Note      Subjective: Patient seen and examined. No acute events overnight.     T(C): 37.1 (04-24-19 @ 08:36), Max: 37.1 (04-23-19 @ 10:24)  HR: 82 (04-24-19 @ 08:36) (73 - 98)  BP: 123/39 (04-24-19 @ 08:36) (114/50 - 157/59)  RR: 18 (04-24-19 @ 08:36) (12 - 20)  SpO2: 95% (04-24-19 @ 08:36) (94% - 100%)      04-23-19 @ 07:01  -  04-24-19 @ 07:00  --------------------------------------------------------  IN: 1300 mL / OUT: 835 mL / NET: 465 mL        Physical Exam:   General: NAD  Abdomen: soft, appropriately tender, midline incision with louis    Labs:                        8.7    18.91 )-----------( 277      ( 24 Apr 2019 05:11 )             30.6     04-24    142  |  104  |  24<H>  ----------------------------<  146<H>  3.9   |  20<L>  |  0.86    Ca    9.4      24 Apr 2019 05:11  Phos  4.2     04-24  Mg     1.9     04-24    TPro  5.4<L>  /  Alb  3.4  /  TBili  0.9  /  DBili  0.2  /  AST  122<H>  /  ALT  81<H>  /  AlkPhos  61  04-24      Medications:     acetaminophen  IVPB .. 1000 milliGRAM(s) IV Intermittent once  ALBUTerol/ipratropium for Nebulization 3 milliLiter(s) Nebulizer every 6 hours  atorvastatin 20 milliGRAM(s) Oral at bedtime  dextrose 5% + sodium chloride 0.45%. 1000 milliLiter(s) IV Continuous <Continuous>  heparin  Injectable 5000 Unit(s) SubCutaneous every 12 hours  hydrochlorothiazide 25 milliGRAM(s) Oral daily  HYDROmorphone  Injectable 0.5 milliGRAM(s) IV Push every 3 hours PRN  HYDROmorphone (10 MICROgram(s)/mL) + BUpivacaine 0.0625% in 0.9% Sodium Chloride PCEA 250 milliLiter(s) Epidural PCA Continuous  HYDROmorphone (10 MICROgram(s)/mL) + BUpivacaine 0.0625% in 0.9% Sodium Chloride PCEA Rescue Clinician  Bolus 4 milliLiter(s) Epidural every 15 minutes PRN  latanoprost 0.005% Ophthalmic Solution 1 Drop(s) Both EYES at bedtime  losartan 50 milliGRAM(s) Oral daily  magnesium sulfate  IVPB 1 Gram(s) IV Intermittent once  naloxone Injectable 0.1 milliGRAM(s) IV Push every 3 minutes PRN  naloxone Injectable 0.1 milliGRAM(s) IV Push every 3 minutes PRN  ondansetron Injectable 4 milliGRAM(s) IV Push every 6 hours PRN  ondansetron Injectable 4 milliGRAM(s) IV Push once PRN  ondansetron Injectable 4 milliGRAM(s) IV Push every 6 hours PRN  potassium chloride  10 mEq/100 mL IVPB 10 milliEquivalent(s) IV Intermittent once      Radiographs: No new imaging

## 2019-04-24 NOTE — CONSULT NOTE ADULT - ASSESSMENT
89 y/o female with colon cancer s/p extended right colectomy with primary anastomosis and microwave ablation of liver lesion     - pain control  diet as per sx  leukocytosis likely reactive  oob  dvt proph  copd stable  htn stable  resume meds   analgesics

## 2019-04-24 NOTE — PROGRESS NOTE ADULT - ASSESSMENT
89F with colon cancer s/p extended right colectomy with primary stapled anastomosis and microwave ablation of liver lesion (4/23)    - pain control  - diet: CLD  - d/c bill, f/u [facility]    D Team  [id number]

## 2019-04-24 NOTE — PROGRESS NOTE ADULT - SUBJECTIVE AND OBJECTIVE BOX
Subjective: Patient is a 89y old  Female  with hypertension Copd hyperlipidemia who presents with a chief complaint weight loss anemia i performed a colonoscopy  found iron deficency anemia obstructing tumor hepatic flexure day 1 post op   PAST MEDICAL & SURGICAL HISTORY:  Anemia  Hearing deficit, bilateral  Hepatic lesion  Disease of intestine  HTN (hypertension)  Hyperlipidemia  Glaucoma  COPD (chronic obstructive pulmonary disease)  H/O blepharoplasty: BILATERAL  S/P eye surgery: s/p corneal transplant, right  Bilateral cataracts      Allergies    No Known Allergies    Intolerances        MEDICATIONS  (STANDING):  lactated ringers. 1000 milliLiter(s) (30 mL/Hr) IV Continuous <Continuous>  sodium chloride 0.9% lock flush 3 milliLiter(s) IV Push every 8 hours    MEDICATIONS  (PRN):      CONSTITUTIONAL: No fever,20lb some  fatigue  EYES: No eye pain, visual disturbances, or discharge  ENMT:  No difficulty hearing, tinnitus, vertigo; No sinus or throat pain  NECK: No pain or stiffness  BREASTS: No pain, masses, or nipple discharge  RESPIRATORY: No cough, wheezing, chills or hemoptysis; No shortness of breath  CARDIOVASCULAR: No chest pain, palpitations, dizziness, or leg swelling  GASTROINTESTINAL: No abdominal or epigastric pain. No nausea, vomiting, or hematemesis; No diarrhea or constipation. No melena or hematochezia.  GENITOURINARY: No dysuria, frequency, hematuria, or incontinence  NEUROLOGICAL: No headaches, memory loss, loss of strength, numbness, or tremors  SKIN: No itching, burning, rashes, or lesions   LYMPH NODES: No enlarged glands  ENDOCRINE: No heat or cold intolerance; No hair loss  MUSCULOSKELETAL: No joint pain or swelling; No muscle, back, or extremity pain  PSYCHIATRIC: No depression, anxiety, mood swings, or difficulty sleeping  HEME/LYMPH: No easy bruising, or bleeding gums  ALLERY AND IMMUNOLOGIC: No hives or eczema      PHYSICAL EXAM:    GENERAL: Comfortable, no acute distress pale NPO  HEAD:  Normocephalic, atraumatic  EYES: EOMI, PERRLA  HEENT: Moist mucous membranes  NECK: Supple, No JVD  NERVOUS SYSTEM:  Alert & Oriented X3, Motor Strength 5/5 B/L upper and lower extremities  CHEST/LUNG: Clear to auscultation bilaterally  HEART: Regular rate and rhythm  ABDOMEN: Soft, Nontender,distended, dressed  GENITOURINARY: Voiding, no palpable bladder  EXTREMITIES:   No clubbing, cyanosis, or edema  MUSCULOSKELTAL- No muscle tenderness, no joint tenderness  SKIN-no rash    guiac positive stools  p88         132/80 afebrile     cea 31  low iron 13   folate nl  iron deficiency anemia     Complete Blood Count (04.23.19 @ 18:23)    WBC Count: 15.62 K/uL    RBC Count: 4.63 M/uL    Hemoglobin: 9.2 g/dL    Hematocrit: 31.7 %    Mean Cell Volume: 68.5 fL    Mean Cell Hemoglobin: 19.9 pg    Mean Cell Hemoglobin Conc: 29.0 %    Red Cell Distrib Width: 22.8 %    Platelet Count - Automated: 279 K/uL    MPV: 10.3 fl    Nucleated RBC #: 0 K/uL    Basic Metabolic Panel w/Mg &amp; Inorg Phos (04.23.19 @ 17:40)    Calcium, Total Serum: 9.4 mg/dL    Phosphorus Level, Serum: 3.5 mg/dL    eGFR if : 71 mL/min    eGFR if Non : 62: The units for eGFR are ml/min/1.73m2 (normalized body  surface area). The eGFR is calculated from a serum  creatinine using the CKD-EPI equation. Other variables  required for calculation are race, age and sex. Among  patients with chronic kidney disease (CKD), the eGFR is  useful in determining the stage of disease according to  KDOQI CKD classification. All eGFR results are reported  numerically with the following interpretation.    GFR  (ml/min/1.73 m2)          W/KIDNEY DAMAGE    W/O KIDNEY DMG  ==========================================================  >= 90.......................Stage 1..............Normal  60-89.......................Stage 2...........Decreased GFR  30-59.......................Stage 3..............Stage 3  15-29.......................Stage 4..............Stage 4  < 15........................Stage 5..............Stage 5    Each stage of CKD assumes that the associated GFR level  has been in effect for at least 3 months. Determination of  stages one and two (with eGFR > 59ml/min/m2) requires  estimation of kidney damage for at least 3 months as  defined by structural or functional abnormalities.    Limitations: All estimates of GFR will be less accurate  for patients at extremes of muscle mass (including but  not limited to frail elderly, critically ill, or cancer  patients), those with unusual diets, and those with  conditions associated with reduced secretion or  extrarenal elimination of creatinine. The eGFR equation  is not recommended for use in patients with unstable  creatinine levels. mL/min    Sodium, Serum: 142 mmol/L    Potassium, Serum: 3.8 mmol/L    Chloride, Serum: 103 mmol/L    Carbon Dioxide, Serum: 21 mmol/L    Anion Gap, Serum: 18 mmo/L    Blood Urea Nitrogen, Serum: 20 mg/dL    Creatinine, Serum: 0.83 mg/dL    Glucose, Serum: 104 mg/dL    Magnesium, Serum: 1.9 mg/dL                EKG:    Imaging Studies:    Impression / Day 1 status post hemicolectomy for ca colon

## 2019-04-24 NOTE — PROGRESS NOTE ADULT - SUBJECTIVE AND OBJECTIVE BOX
Anesthesia Pain Management Service- Attending Addendum    SUBJECTIVE: Pt doing well with PCEA without problems reported.    Therapy:	  [ ] IV PCA	   [ X] Epidural           [ ] s/p Spinal Opoid              [ ] Postpartum infusion	  [ ] Patient controlled regional anesthesia (PCRA)    [ ] prn Analgesics    Allergies    No Known Allergies    Intolerances      MEDICATIONS  (STANDING):  ALBUTerol/ipratropium for Nebulization 3 milliLiter(s) Nebulizer every 6 hours  atorvastatin 20 milliGRAM(s) Oral at bedtime  dextrose 5% + sodium chloride 0.9% with potassium chloride 20 mEq/L 1000 milliLiter(s) (50 mL/Hr) IV Continuous <Continuous>  heparin  Injectable 5000 Unit(s) SubCutaneous every 12 hours  hydrochlorothiazide 25 milliGRAM(s) Oral daily  HYDROmorphone (10 MICROgram(s)/mL) + BUpivacaine 0.0625% in 0.9% Sodium Chloride PCEA 250 milliLiter(s) Epidural PCA Continuous  latanoprost 0.005% Ophthalmic Solution 1 Drop(s) Both EYES at bedtime  losartan 50 milliGRAM(s) Oral daily    MEDICATIONS  (PRN):  HYDROmorphone  Injectable 0.5 milliGRAM(s) IV Push every 3 hours PRN severe breakthrough pain  HYDROmorphone (10 MICROgram(s)/mL) + BUpivacaine 0.0625% in 0.9% Sodium Chloride PCEA Rescue Clinician  Bolus 4 milliLiter(s) Epidural every 15 minutes PRN for Pain Score greater than 6  naloxone Injectable 0.1 milliGRAM(s) IV Push every 3 minutes PRN For ANY of the following changes in patient status:  A. RR LESS THAN 10 breaths per minute, B. Oxygen saturation LESS THAN 90%, C. Sedation score of 6  naloxone Injectable 0.1 milliGRAM(s) IV Push every 3 minutes PRN For ANY of the following changes in patient status:  A. RR LESS THAN 10 breaths per minute, B. Oxygen saturation LESS THAN 90%, C. Sedation score of 6  ondansetron Injectable 4 milliGRAM(s) IV Push every 6 hours PRN Nausea  ondansetron Injectable 4 milliGRAM(s) IV Push once PRN Nausea and/or Vomiting  ondansetron Injectable 4 milliGRAM(s) IV Push every 6 hours PRN Nausea      OBJECTIVE:   [X] No new signs     [ ] Other:    Side Effects:  [X ] None			[ ] Other:    Assessment of Catheter Site:		[ X] Intact		[ ] Other:    ASSESSMENT/PLAN  [ X] Continue current therapy    [ ] Therapy changed to:    [ ] IV PCA       [ ] Epidural     [ ] prn Analgesics     Comments: Continue current PCEA settings.

## 2019-04-24 NOTE — PROGRESS NOTE ADULT - SUBJECTIVE AND OBJECTIVE BOX
Anesthesia Pain Management Service: Day 2__ of Epidural    SUBJECTIVE: Patient doing well with PCEA and no problems.  Pain Scale Score: 0/10  Refer to charted pain scores    THERAPY:  [x ] Epidural Bupivacaine 0.0625% and Hydromorphone  		[ X] 10 micrograms/mL	[ ] 5 micrograms/mL  [ ] Epidural Bupivacaine 0.0625% and Fentanyl - 2 micrograms/mL  [ ] Epidural Ropivacaine 0.1% plain – 1 mg/mL  [ ] Patient Controlled Regional Anesthesia (PCRA) Ropivacaine  		[ ] 0.2%			[ ] 0.1%    Demand dose __3_ lockout __15_ (minutes) Continuous Rate _4__ Total: __53.20__ ml used (in past 24 hours)      MEDICATIONS  (STANDING):  acetaminophen  IVPB .. 1000 milliGRAM(s) IV Intermittent once  ALBUTerol/ipratropium for Nebulization 3 milliLiter(s) Nebulizer every 6 hours  atorvastatin 20 milliGRAM(s) Oral at bedtime  dextrose 5% + sodium chloride 0.45%. 1000 milliLiter(s) (50 mL/Hr) IV Continuous <Continuous>  heparin  Injectable 5000 Unit(s) SubCutaneous every 12 hours  hydrochlorothiazide 25 milliGRAM(s) Oral daily  HYDROmorphone (10 MICROgram(s)/mL) + BUpivacaine 0.0625% in 0.9% Sodium Chloride PCEA 250 milliLiter(s) Epidural PCA Continuous  latanoprost 0.005% Ophthalmic Solution 1 Drop(s) Both EYES at bedtime  losartan 50 milliGRAM(s) Oral daily  magnesium sulfate  IVPB 1 Gram(s) IV Intermittent once  potassium chloride  10 mEq/100 mL IVPB 10 milliEquivalent(s) IV Intermittent once    MEDICATIONS  (PRN):  HYDROmorphone  Injectable 0.5 milliGRAM(s) IV Push every 3 hours PRN severe breakthrough pain  HYDROmorphone (10 MICROgram(s)/mL) + BUpivacaine 0.0625% in 0.9% Sodium Chloride PCEA Rescue Clinician  Bolus 4 milliLiter(s) Epidural every 15 minutes PRN for Pain Score greater than 6  naloxone Injectable 0.1 milliGRAM(s) IV Push every 3 minutes PRN For ANY of the following changes in patient status:  A. RR LESS THAN 10 breaths per minute, B. Oxygen saturation LESS THAN 90%, C. Sedation score of 6  naloxone Injectable 0.1 milliGRAM(s) IV Push every 3 minutes PRN For ANY of the following changes in patient status:  A. RR LESS THAN 10 breaths per minute, B. Oxygen saturation LESS THAN 90%, C. Sedation score of 6  ondansetron Injectable 4 milliGRAM(s) IV Push every 6 hours PRN Nausea  ondansetron Injectable 4 milliGRAM(s) IV Push once PRN Nausea and/or Vomiting  ondansetron Injectable 4 milliGRAM(s) IV Push every 6 hours PRN Nausea      OBJECTIVE: Patient is lying in bed, able to turn on her own, and comfortable.    Assessment of Catheter Site:	[ ] Left	[ ] Right  [x ] Epidural 	[ ] Femoral	      [ ] Saphenous   [ ] Supraclavicular   [ ] Other:    [x ] Dressing intact	[x ] Site non-tender	[ x] Site without erythema, discharge, edema  [x ] Epidural tubing and connection checked	[x] Gross neurological exam within normal limits  [ ] Catheter removed – tip intact		[ ] Afebrile  	[ ] Febrile: ___   [ X] see Temp under VS below)                          8.7    18.91 )-----------( 277      ( 24 Apr 2019 05:11 )             30.6     Vital Signs Last 24 Hrs  T(C): 37.1 (04-24-19 @ 08:36), Max: 37.1 (04-23-19 @ 10:24)  T(F): 98.7 (04-24-19 @ 08:36), Max: 98.7 (04-23-19 @ 10:24)  HR: 82 (04-24-19 @ 08:36) (73 - 98)  BP: 123/39 (04-24-19 @ 08:36) (114/50 - 157/59)  BP(mean): 59 (04-24-19 @ 08:36) (59 - 82)  RR: 18 (04-24-19 @ 08:36) (12 - 20)  SpO2: 95% (04-24-19 @ 08:36) (94% - 100%)      Sedation Score:	[x ] Alert	[ ] Drowsy	[ ] Arousable	[ ] Asleep	[ ] Unresponsive    Side Effects:	[x ] None	[ ] Nausea	[ ] Vomiting	[ ] Pruritus  		[ ] Weakness		[ ] Numbness	[ ] Other:    ASSESSMENT/ PLAN:    Therapy to  be:	[x ] Continue   [ ] Discontinued   [ ] Change to prn Analgesics    Documentation and Verification of current medications:  [ X ] Done	[ ] Not done, not eligible, reason:    Comments: Doing OK with epidural and may continue. Anesthesia Pain Management Service: Day 2__ of Epidural    SUBJECTIVE: Patient doing well with PCEA and no problems.  Pain Scale Score: 0/10  Refer to charted pain scores    THERAPY:  [x ] Epidural Bupivacaine 0.0625% and Hydromorphone  		[ X] 10 micrograms/mL	[ ] 5 micrograms/mL  [ ] Epidural Bupivacaine 0.0625% and Fentanyl - 2 micrograms/mL  [ ] Epidural Ropivacaine 0.1% plain – 1 mg/mL  [ ] Patient Controlled Regional Anesthesia (PCRA) Ropivacaine  		[ ] 0.2%			[ ] 0.1%    Demand dose __3_ lockout __15_ (minutes) Continuous Rate _4__ Total: __53.20__ ml used (in past 24 hours)      MEDICATIONS  (STANDING):  acetaminophen  IVPB .. 1000 milliGRAM(s) IV Intermittent once  ALBUTerol/ipratropium for Nebulization 3 milliLiter(s) Nebulizer every 6 hours  atorvastatin 20 milliGRAM(s) Oral at bedtime  dextrose 5% + sodium chloride 0.45%. 1000 milliLiter(s) (50 mL/Hr) IV Continuous <Continuous>  heparin  Injectable 5000 Unit(s) SubCutaneous every 12 hours  hydrochlorothiazide 25 milliGRAM(s) Oral daily  HYDROmorphone (10 MICROgram(s)/mL) + BUpivacaine 0.0625% in 0.9% Sodium Chloride PCEA 250 milliLiter(s) Epidural PCA Continuous  latanoprost 0.005% Ophthalmic Solution 1 Drop(s) Both EYES at bedtime  losartan 50 milliGRAM(s) Oral daily  magnesium sulfate  IVPB 1 Gram(s) IV Intermittent once  potassium chloride  10 mEq/100 mL IVPB 10 milliEquivalent(s) IV Intermittent once    MEDICATIONS  (PRN):  HYDROmorphone  Injectable 0.5 milliGRAM(s) IV Push every 3 hours PRN severe breakthrough pain  HYDROmorphone (10 MICROgram(s)/mL) + BUpivacaine 0.0625% in 0.9% Sodium Chloride PCEA Rescue Clinician  Bolus 4 milliLiter(s) Epidural every 15 minutes PRN for Pain Score greater than 6  naloxone Injectable 0.1 milliGRAM(s) IV Push every 3 minutes PRN For ANY of the following changes in patient status:  A. RR LESS THAN 10 breaths per minute, B. Oxygen saturation LESS THAN 90%, C. Sedation score of 6  naloxone Injectable 0.1 milliGRAM(s) IV Push every 3 minutes PRN For ANY of the following changes in patient status:  A. RR LESS THAN 10 breaths per minute, B. Oxygen saturation LESS THAN 90%, C. Sedation score of 6  ondansetron Injectable 4 milliGRAM(s) IV Push every 6 hours PRN Nausea  ondansetron Injectable 4 milliGRAM(s) IV Push once PRN Nausea and/or Vomiting  ondansetron Injectable 4 milliGRAM(s) IV Push every 6 hours PRN Nausea      OBJECTIVE: Patient is lying in bed, able to turn on her own, and comfortable.    Assessment of Catheter Site:	[ ] Left	[ ] Right  [x ] Epidural 	[ ] Femoral	      [ ] Saphenous   [ ] Supraclavicular   [ ] Other:    [x ] Dressing intact	[x ] Site non-tender	[ x] Site without erythema, discharge, edema  [x ] Epidural tubing and connection checked	[x] Gross neurological exam within normal limits  [ ] Catheter removed – tip intact		[ ] Afebrile  	[ ] Febrile: ___   [ X] see Temp under VS below)                          8.7    18.91 )-----------( 277      ( 24 Apr 2019 05:11 )             30.6     Vital Signs Last 24 Hrs  T(C): 37.1 (04-24-19 @ 08:36), Max: 37.1 (04-23-19 @ 10:24)  T(F): 98.7 (04-24-19 @ 08:36), Max: 98.7 (04-23-19 @ 10:24)  HR: 82 (04-24-19 @ 08:36) (73 - 98)  BP: 123/39 (04-24-19 @ 08:36) (114/50 - 157/59)  BP(mean): 59 (04-24-19 @ 08:36) (59 - 82)  RR: 18 (04-24-19 @ 08:36) (12 - 20)  SpO2: 95% (04-24-19 @ 08:36) (94% - 100%)      Sedation Score:	[x ] Alert	[ ] Drowsy	[ ] Arousable	[ ] Asleep	[ ] Unresponsive    Side Effects:	[x ] None	[ ] Nausea	[ ] Vomiting	[ ] Pruritus  		[ ] Weakness		[ ] Numbness	[ ] Other:    ASSESSMENT/ PLAN:    Therapy to  be:	[x ] Continue   [ ] Discontinued   [ ] Change to prn Analgesics    Documentation and Verification of current medications:  [ X ] Done	[ ] Not done, not eligible, reason:    Comments: Doing OK with epidural and may continue.  Coags ordered STAT, informed RN. Will follow-up. Anesthesia Pain Management Service: Day 2__ of Epidural    SUBJECTIVE: Patient doing well with PCEA and no problems.  Pain Scale Score: 0/10  Refer to charted pain scores    THERAPY:  [x ] Epidural Bupivacaine 0.0625% and Hydromorphone  		[ X] 10 micrograms/mL	[ ] 5 micrograms/mL  [ ] Epidural Bupivacaine 0.0625% and Fentanyl - 2 micrograms/mL  [ ] Epidural Ropivacaine 0.1% plain – 1 mg/mL  [ ] Patient Controlled Regional Anesthesia (PCRA) Ropivacaine  		[ ] 0.2%			[ ] 0.1%    Demand dose __3_ lockout __15_ (minutes) Continuous Rate _4__ Total: __53.20__ ml used (in past 24 hours)      MEDICATIONS  (STANDING):  acetaminophen  IVPB .. 1000 milliGRAM(s) IV Intermittent once  ALBUTerol/ipratropium for Nebulization 3 milliLiter(s) Nebulizer every 6 hours  atorvastatin 20 milliGRAM(s) Oral at bedtime  dextrose 5% + sodium chloride 0.45%. 1000 milliLiter(s) (50 mL/Hr) IV Continuous <Continuous>  heparin  Injectable 5000 Unit(s) SubCutaneous every 12 hours  hydrochlorothiazide 25 milliGRAM(s) Oral daily  HYDROmorphone (10 MICROgram(s)/mL) + BUpivacaine 0.0625% in 0.9% Sodium Chloride PCEA 250 milliLiter(s) Epidural PCA Continuous  latanoprost 0.005% Ophthalmic Solution 1 Drop(s) Both EYES at bedtime  losartan 50 milliGRAM(s) Oral daily  magnesium sulfate  IVPB 1 Gram(s) IV Intermittent once  potassium chloride  10 mEq/100 mL IVPB 10 milliEquivalent(s) IV Intermittent once    MEDICATIONS  (PRN):  HYDROmorphone  Injectable 0.5 milliGRAM(s) IV Push every 3 hours PRN severe breakthrough pain  HYDROmorphone (10 MICROgram(s)/mL) + BUpivacaine 0.0625% in 0.9% Sodium Chloride PCEA Rescue Clinician  Bolus 4 milliLiter(s) Epidural every 15 minutes PRN for Pain Score greater than 6  naloxone Injectable 0.1 milliGRAM(s) IV Push every 3 minutes PRN For ANY of the following changes in patient status:  A. RR LESS THAN 10 breaths per minute, B. Oxygen saturation LESS THAN 90%, C. Sedation score of 6  naloxone Injectable 0.1 milliGRAM(s) IV Push every 3 minutes PRN For ANY of the following changes in patient status:  A. RR LESS THAN 10 breaths per minute, B. Oxygen saturation LESS THAN 90%, C. Sedation score of 6  ondansetron Injectable 4 milliGRAM(s) IV Push every 6 hours PRN Nausea  ondansetron Injectable 4 milliGRAM(s) IV Push once PRN Nausea and/or Vomiting  ondansetron Injectable 4 milliGRAM(s) IV Push every 6 hours PRN Nausea      OBJECTIVE: Patient is lying in bed, able to turn on her own, and comfortable.    Assessment of Catheter Site:	[ ] Left	[ ] Right  [x ] Epidural 	[ ] Femoral	      [ ] Saphenous   [ ] Supraclavicular   [ ] Other:    [x ] Dressing intact	[x ] Site non-tender	[ x] Site without erythema, discharge, edema  [x ] Epidural tubing and connection checked	[x] Gross neurological exam within normal limits  [ ] Catheter removed – tip intact		[ ] Afebrile  	[ ] Febrile: ___   [ X] see Temp under VS below)                          8.7    18.91 )-----------( 277      ( 24 Apr 2019 05:11 )             30.6     Vital Signs Last 24 Hrs  T(C): 37.1 (04-24-19 @ 08:36), Max: 37.1 (04-23-19 @ 10:24)  T(F): 98.7 (04-24-19 @ 08:36), Max: 98.7 (04-23-19 @ 10:24)  HR: 82 (04-24-19 @ 08:36) (73 - 98)  BP: 123/39 (04-24-19 @ 08:36) (114/50 - 157/59)  BP(mean): 59 (04-24-19 @ 08:36) (59 - 82)  RR: 18 (04-24-19 @ 08:36) (12 - 20)  SpO2: 95% (04-24-19 @ 08:36) (94% - 100%)      Sedation Score:	[x ] Alert	[ ] Drowsy	[ ] Arousable	[ ] Asleep	[ ] Unresponsive    Side Effects:	[x ] None	[ ] Nausea	[ ] Vomiting	[ ] Pruritus  		[ ] Weakness		[ ] Numbness	[ ] Other:    ASSESSMENT/ PLAN:    Therapy to  be:	[x ] Continue   [ ] Discontinued   [ ] Change to prn Analgesics    Documentation and Verification of current medications:  [ X ] Done	[ ] Not done, not eligible, reason:    Comments: Doing OK with epidural and may continue.  Coags ordered STAT, informed RN. Will follow-up.    PTT elevated, discussed with pain attending. Plan is to have to get a hematology consult and mix study for elevated PTT.

## 2019-04-25 LAB
ALBUMIN SERPL ELPH-MCNC: 3 G/DL — LOW (ref 3.3–5)
ALP SERPL-CCNC: 56 U/L — SIGNIFICANT CHANGE UP (ref 40–120)
ALT FLD-CCNC: 80 U/L — HIGH (ref 4–33)
ANION GAP SERPL CALC-SCNC: 10 MMO/L — SIGNIFICANT CHANGE UP (ref 7–14)
APTT BLD: 47.9 SEC — HIGH (ref 27.5–36.3)
AST SERPL-CCNC: 86 U/L — HIGH (ref 4–32)
BILIRUB SERPL-MCNC: 0.5 MG/DL — SIGNIFICANT CHANGE UP (ref 0.2–1.2)
BUN SERPL-MCNC: 16 MG/DL — SIGNIFICANT CHANGE UP (ref 7–23)
CALCIUM SERPL-MCNC: 8.9 MG/DL — SIGNIFICANT CHANGE UP (ref 8.4–10.5)
CHLORIDE SERPL-SCNC: 104 MMOL/L — SIGNIFICANT CHANGE UP (ref 98–107)
CO2 SERPL-SCNC: 26 MMOL/L — SIGNIFICANT CHANGE UP (ref 22–31)
CREAT SERPL-MCNC: 0.73 MG/DL — SIGNIFICANT CHANGE UP (ref 0.5–1.3)
GLUCOSE SERPL-MCNC: 102 MG/DL — HIGH (ref 70–99)
HCT VFR BLD CALC: 30.4 % — LOW (ref 34.5–45)
HGB BLD-MCNC: 8.3 G/DL — LOW (ref 11.5–15.5)
INR BLD: 1.13 — SIGNIFICANT CHANGE UP (ref 0.88–1.17)
MAGNESIUM SERPL-MCNC: 2 MG/DL — SIGNIFICANT CHANGE UP (ref 1.6–2.6)
MCHC RBC-ENTMCNC: 19.6 PG — LOW (ref 27–34)
MCHC RBC-ENTMCNC: 27.3 % — LOW (ref 32–36)
MCV RBC AUTO: 71.9 FL — LOW (ref 80–100)
NRBC # FLD: 0 K/UL — SIGNIFICANT CHANGE UP (ref 0–0)
PHOSPHATE SERPL-MCNC: 2.5 MG/DL — SIGNIFICANT CHANGE UP (ref 2.5–4.5)
PLATELET # BLD AUTO: 206 K/UL — SIGNIFICANT CHANGE UP (ref 150–400)
PMV BLD: 10.4 FL — SIGNIFICANT CHANGE UP (ref 7–13)
POTASSIUM SERPL-MCNC: 3.7 MMOL/L — SIGNIFICANT CHANGE UP (ref 3.5–5.3)
POTASSIUM SERPL-SCNC: 3.7 MMOL/L — SIGNIFICANT CHANGE UP (ref 3.5–5.3)
PROT SERPL-MCNC: 4.6 G/DL — LOW (ref 6–8.3)
PROTHROM AB SERPL-ACNC: 12.9 SEC — SIGNIFICANT CHANGE UP (ref 9.8–13.1)
RBC # BLD: 4.23 M/UL — SIGNIFICANT CHANGE UP (ref 3.8–5.2)
RBC # FLD: 22.7 % — HIGH (ref 10.3–14.5)
SODIUM SERPL-SCNC: 140 MMOL/L — SIGNIFICANT CHANGE UP (ref 135–145)
WBC # BLD: 15.1 K/UL — HIGH (ref 3.8–10.5)
WBC # FLD AUTO: 15.1 K/UL — HIGH (ref 3.8–10.5)

## 2019-04-25 RX ORDER — POTASSIUM CHLORIDE 20 MEQ
20 PACKET (EA) ORAL
Qty: 0 | Refills: 0 | Status: COMPLETED | OUTPATIENT
Start: 2019-04-25 | End: 2019-04-25

## 2019-04-25 RX ORDER — IPRATROPIUM/ALBUTEROL SULFATE 18-103MCG
3 AEROSOL WITH ADAPTER (GRAM) INHALATION EVERY 6 HOURS
Qty: 0 | Refills: 0 | Status: DISCONTINUED | OUTPATIENT
Start: 2019-04-25 | End: 2019-05-01

## 2019-04-25 RX ADMIN — Medication 20 MILLIEQUIVALENT(S): at 11:42

## 2019-04-25 RX ADMIN — HEPARIN SODIUM 5000 UNIT(S): 5000 INJECTION INTRAVENOUS; SUBCUTANEOUS at 05:15

## 2019-04-25 RX ADMIN — LOSARTAN POTASSIUM 50 MILLIGRAM(S): 100 TABLET, FILM COATED ORAL at 05:18

## 2019-04-25 RX ADMIN — HEPARIN SODIUM 5000 UNIT(S): 5000 INJECTION INTRAVENOUS; SUBCUTANEOUS at 17:30

## 2019-04-25 RX ADMIN — ATORVASTATIN CALCIUM 20 MILLIGRAM(S): 80 TABLET, FILM COATED ORAL at 21:35

## 2019-04-25 RX ADMIN — Medication 25 MILLIGRAM(S): at 05:15

## 2019-04-25 RX ADMIN — LATANOPROST 1 DROP(S): 0.05 SOLUTION/ DROPS OPHTHALMIC; TOPICAL at 21:35

## 2019-04-25 RX ADMIN — Medication 20 MILLIEQUIVALENT(S): at 08:28

## 2019-04-25 NOTE — PROGRESS NOTE ADULT - SUBJECTIVE AND OBJECTIVE BOX
Surgery Progress Note    S: Patient seen and examined. No acute events overnight. patient tolerated CLD without n/v. pain is well controlled. - flatus, - BM.     O:  Vital Signs Last 24 Hrs  T(C): 37.1 (25 Apr 2019 00:14), Max: 37.1 (24 Apr 2019 08:36)  T(F): 98.7 (25 Apr 2019 00:14), Max: 98.7 (24 Apr 2019 08:36)  HR: 81 (25 Apr 2019 00:14) (81 - 102)  BP: 130/42 (25 Apr 2019 00:14) (112/37 - 135/45)  BP(mean): 59 (24 Apr 2019 08:36) (59 - 59)  RR: 18 (25 Apr 2019 00:14) (18 - 18)  SpO2: 98% (25 Apr 2019 00:14) (95% - 100%)    I&O's Detail    23 Apr 2019 07:01  -  24 Apr 2019 07:00  --------------------------------------------------------  IN:    IV PiggyBack: 200 mL    lactated ringers.: 1000 mL    Oral Fluid: 100 mL  Total IN: 1300 mL    OUT:    Indwelling Catheter - Urethral: 835 mL  Total OUT: 835 mL    Total NET: 465 mL      24 Apr 2019 07:01  -  25 Apr 2019 00:28  --------------------------------------------------------  IN:    dextrose 5% + sodium chloride 0.9% with potassium chloride 20 mEq/L: 600 mL  Total IN: 600 mL    OUT:    Indwelling Catheter - Urethral: 950 mL  Total OUT: 950 mL    Total NET: -350 mL          MEDICATIONS  (STANDING):  ALBUTerol/ipratropium for Nebulization 3 milliLiter(s) Nebulizer every 6 hours  atorvastatin 20 milliGRAM(s) Oral at bedtime  dextrose 5% + sodium chloride 0.9% with potassium chloride 20 mEq/L 1000 milliLiter(s) (50 mL/Hr) IV Continuous <Continuous>  heparin  Injectable 5000 Unit(s) SubCutaneous every 12 hours  hydrochlorothiazide 25 milliGRAM(s) Oral daily  HYDROmorphone (10 MICROgram(s)/mL) + BUpivacaine 0.0625% in 0.9% Sodium Chloride PCEA 250 milliLiter(s) Epidural PCA Continuous  latanoprost 0.005% Ophthalmic Solution 1 Drop(s) Both EYES at bedtime  losartan 50 milliGRAM(s) Oral daily    MEDICATIONS  (PRN):  HYDROmorphone  Injectable 0.5 milliGRAM(s) IV Push every 3 hours PRN severe breakthrough pain  HYDROmorphone (10 MICROgram(s)/mL) + BUpivacaine 0.0625% in 0.9% Sodium Chloride PCEA Rescue Clinician  Bolus 4 milliLiter(s) Epidural every 15 minutes PRN for Pain Score greater than 6  naloxone Injectable 0.1 milliGRAM(s) IV Push every 3 minutes PRN For ANY of the following changes in patient status:  A. RR LESS THAN 10 breaths per minute, B. Oxygen saturation LESS THAN 90%, C. Sedation score of 6  naloxone Injectable 0.1 milliGRAM(s) IV Push every 3 minutes PRN For ANY of the following changes in patient status:  A. RR LESS THAN 10 breaths per minute, B. Oxygen saturation LESS THAN 90%, C. Sedation score of 6  ondansetron Injectable 4 milliGRAM(s) IV Push every 6 hours PRN Nausea  ondansetron Injectable 4 milliGRAM(s) IV Push once PRN Nausea and/or Vomiting  ondansetron Injectable 4 milliGRAM(s) IV Push every 6 hours PRN Nausea                            8.7    18.91 )-----------( 277      ( 24 Apr 2019 05:11 )             30.6       04-24    142  |  104  |  24<H>  ----------------------------<  146<H>  3.9   |  20<L>  |  0.86    Ca    9.4      24 Apr 2019 05:11  Phos  4.2     04-24  Mg     1.9     04-24    TPro  5.4<L>  /  Alb  3.4  /  TBili  0.9  /  DBili  0.2  /  AST  122<H>  /  ALT  81<H>  /  AlkPhos  61  04-24      Physical Exam:  Gen: Laying in bed, NAD  Resp: Unlabored breathing  Abd: soft, minimally tender around midline incision, midline incision with betadine louis in place, ND, no rebound or guarding  Ext: WWP  : khan in place draining yellow urine

## 2019-04-25 NOTE — PROGRESS NOTE ADULT - SUBJECTIVE AND OBJECTIVE BOX
Day __2_ of Anesthesia Pain Management Service    SUBJECTIVE:    Therapy:	  [ ] IV PCA	   [x ] Epidural           [ ] s/p Spinal Opoid              [ ] Postpartum infusion	  [ ] Patient controlled regional anesthesia (PCRA)    [ ] prn Analgesics    OBJECTIVE:   [x ] No new signs     [ ] Other:    Side Effects:  [ x] None			[ ] Other:    Assessment of Catheter Site:		[ x] Intact		[ ] Other:    ASSESSMENT/PLAN  [ x] Continue current therapy    [ ] Therapy changed to:    [ ] IV PCA       [ ] Epidural     [ ] prn Analgesics     Comments:

## 2019-04-25 NOTE — PROGRESS NOTE ADULT - SUBJECTIVE AND OBJECTIVE BOX
CHIEF COMPLAINT:Patient is a 89y old  Female who presents with a chief complaint of colon ca copd hypertension (25 Apr 2019 04:33)    	        PAST MEDICAL & SURGICAL HISTORY:  Anemia  Hearing deficit, bilateral  Hepatic lesion  Disease of intestine  HTN (hypertension)  Hyperlipidemia  Glaucoma  COPD (chronic obstructive pulmonary disease)  H/O blepharoplasty: BILATERAL  S/P eye surgery: s/p corneal transplant, right  Bilateral cataracts          REVIEW OF SYSTEMS:  CONSTITUTIONAL: No fever, weight loss, or fatigue  EYES: No eye pain, visual disturbances, or discharge  NECK: No pain or stiffness  RESPIRATORY: No cough, wheezing, chills or hemoptysis; No Shortness of Breath  CARDIOVASCULAR: No chest pain, palpitations, passing out, dizziness, or leg swelling  GASTROINTESTINAL: No abdominal or epigastric pain. No nausea, vomiting, or hematemesis; No diarrhea or constipation. No melena or hematochezia.  GENITOURINARY: No dysuria, frequency, hematuria, or incontinence  NEUROLOGICAL: No headaches, memory loss, loss of strength, numbness, or tremors  SKIN: No itching, burning, rashes, or lesions   LYMPH Nodes: No enlarged glands  ENDOCRINE: No heat or cold intolerance; No hair loss  MUSCULOSKELETAL: No joint pain or swelling; No muscle, back, or extremity pain    Medications:  MEDICATIONS  (STANDING):  atorvastatin 20 milliGRAM(s) Oral at bedtime  dextrose 5% + sodium chloride 0.9% with potassium chloride 20 mEq/L 1000 milliLiter(s) (50 mL/Hr) IV Continuous <Continuous>  heparin  Injectable 5000 Unit(s) SubCutaneous every 12 hours  hydrochlorothiazide 25 milliGRAM(s) Oral daily  HYDROmorphone (10 MICROgram(s)/mL) + BUpivacaine 0.0625% in 0.9% Sodium Chloride PCEA 250 milliLiter(s) Epidural PCA Continuous  latanoprost 0.005% Ophthalmic Solution 1 Drop(s) Both EYES at bedtime  losartan 50 milliGRAM(s) Oral daily  potassium chloride    Tablet ER 20 milliEquivalent(s) Oral every 2 hours    MEDICATIONS  (PRN):  ALBUTerol/ipratropium for Nebulization. 3 milliLiter(s) Nebulizer every 6 hours PRN Shortness of Breath and/or Wheezing  HYDROmorphone  Injectable 0.5 milliGRAM(s) IV Push every 3 hours PRN severe breakthrough pain  HYDROmorphone (10 MICROgram(s)/mL) + BUpivacaine 0.0625% in 0.9% Sodium Chloride PCEA Rescue Clinician  Bolus 4 milliLiter(s) Epidural every 15 minutes PRN for Pain Score greater than 6  naloxone Injectable 0.1 milliGRAM(s) IV Push every 3 minutes PRN For ANY of the following changes in patient status:  A. RR LESS THAN 10 breaths per minute, B. Oxygen saturation LESS THAN 90%, C. Sedation score of 6  naloxone Injectable 0.1 milliGRAM(s) IV Push every 3 minutes PRN For ANY of the following changes in patient status:  A. RR LESS THAN 10 breaths per minute, B. Oxygen saturation LESS THAN 90%, C. Sedation score of 6  ondansetron Injectable 4 milliGRAM(s) IV Push every 6 hours PRN Nausea  ondansetron Injectable 4 milliGRAM(s) IV Push once PRN Nausea and/or Vomiting  ondansetron Injectable 4 milliGRAM(s) IV Push every 6 hours PRN Nausea    	    PHYSICAL EXAM:  T(C): 36.9 (04-25-19 @ 08:25), Max: 37.1 (04-25-19 @ 00:14)  HR: 84 (04-25-19 @ 08:25) (81 - 102)  BP: 147/64 (04-25-19 @ 08:25) (112/37 - 147/64)  RR: 18 (04-25-19 @ 08:25) (18 - 18)  SpO2: 91% (04-25-19 @ 08:25) (91% - 98%)  Wt(kg): --  I&O's Summary    24 Apr 2019 07:01  -  25 Apr 2019 07:00  --------------------------------------------------------  IN: 1200 mL / OUT: 1230 mL / NET: -30 mL    25 Apr 2019 07:01  -  25 Apr 2019 09:10  --------------------------------------------------------  IN: 0 mL / OUT: 250 mL / NET: -250 mL        Appearance: Normal	  HEENT:   Normal oral mucosa, PERRL, EOMI	  Lymphatic: No lymphadenopathy  Cardiovascular: Normal S1 S2, No JVD, No murmurs, No edema  Respiratory: Lungs clear to auscultation	  Psychiatry: A & O x 3, Mood & affect appropriate  Gastrointestinal:  Soft, Non-tender, + BS	  Skin: No rashes, No ecchymoses, No cyanosis	  Neurologic: Non-focal  Extremities: Normal range of motion, No clubbing, cyanosis or edema  Vascular: Peripheral pulses palpable 2+ bilaterally    TELEMETRY: 	    ECG:  	  RADIOLOGY:  OTHER: 	  	  LABS:	 	    CARDIAC MARKERS:                                8.3    15.10 )-----------( 206      ( 25 Apr 2019 05:58 )             30.4     04-25    140  |  104  |  16  ----------------------------<  102<H>  3.7   |  26  |  0.73    Ca    8.9      25 Apr 2019 05:58  Phos  2.5     04-25  Mg     2.0     04-25    TPro  4.6<L>  /  Alb  3.0<L>  /  TBili  0.5  /  DBili  x   /  AST  86<H>  /  ALT  80<H>  /  AlkPhos  56  04-25    proBNP:   Lipid Profile:   HgA1c:   TSH: CHIEF COMPLAINT:Patient is a 89y old  Female who presents with a chief complaint of colon ca copd hypertension (25 Apr 2019 04:33)    	        PAST MEDICAL & SURGICAL HISTORY:  Anemia  Hearing deficit, bilateral  Hepatic lesion  Disease of intestine  HTN (hypertension)  Hyperlipidemia  Glaucoma  COPD (chronic obstructive pulmonary disease)  H/O blepharoplasty: BILATERAL  S/P eye surgery: s/p corneal transplant, right  Bilateral cataracts          REVIEW OF SYSTEMS:  CONSTITUTIONAL: feels better  EYES: No eye pain, visual disturbances, or discharge  NECK: No pain or stiffness  RESPIRATORY: No cough, wheezing, chills or hemoptysis; No Shortness of Breath  CARDIOVASCULAR: No chest pain, palpitations, passing out, dizziness,   GASTROINTESTINAL: less abd pain  NEUROLOGICAL: No headaches,     Medications:  MEDICATIONS  (STANDING):  atorvastatin 20 milliGRAM(s) Oral at bedtime  dextrose 5% + sodium chloride 0.9% with potassium chloride 20 mEq/L 1000 milliLiter(s) (50 mL/Hr) IV Continuous <Continuous>  heparin  Injectable 5000 Unit(s) SubCutaneous every 12 hours  hydrochlorothiazide 25 milliGRAM(s) Oral daily  HYDROmorphone (10 MICROgram(s)/mL) + BUpivacaine 0.0625% in 0.9% Sodium Chloride PCEA 250 milliLiter(s) Epidural PCA Continuous  latanoprost 0.005% Ophthalmic Solution 1 Drop(s) Both EYES at bedtime  losartan 50 milliGRAM(s) Oral daily  potassium chloride    Tablet ER 20 milliEquivalent(s) Oral every 2 hours    MEDICATIONS  (PRN):  ALBUTerol/ipratropium for Nebulization. 3 milliLiter(s) Nebulizer every 6 hours PRN Shortness of Breath and/or Wheezing  HYDROmorphone  Injectable 0.5 milliGRAM(s) IV Push every 3 hours PRN severe breakthrough pain  HYDROmorphone (10 MICROgram(s)/mL) + BUpivacaine 0.0625% in 0.9% Sodium Chloride PCEA Rescue Clinician  Bolus 4 milliLiter(s) Epidural every 15 minutes PRN for Pain Score greater than 6  naloxone Injectable 0.1 milliGRAM(s) IV Push every 3 minutes PRN For ANY of the following changes in patient status:  A. RR LESS THAN 10 breaths per minute, B. Oxygen saturation LESS THAN 90%, C. Sedation score of 6  naloxone Injectable 0.1 milliGRAM(s) IV Push every 3 minutes PRN For ANY of the following changes in patient status:  A. RR LESS THAN 10 breaths per minute, B. Oxygen saturation LESS THAN 90%, C. Sedation score of 6  ondansetron Injectable 4 milliGRAM(s) IV Push every 6 hours PRN Nausea  ondansetron Injectable 4 milliGRAM(s) IV Push once PRN Nausea and/or Vomiting  ondansetron Injectable 4 milliGRAM(s) IV Push every 6 hours PRN Nausea    	    PHYSICAL EXAM:  T(C): 36.9 (04-25-19 @ 08:25), Max: 37.1 (04-25-19 @ 00:14)  HR: 84 (04-25-19 @ 08:25) (81 - 102)  BP: 147/64 (04-25-19 @ 08:25) (112/37 - 147/64)  RR: 18 (04-25-19 @ 08:25) (18 - 18)  SpO2: 91% (04-25-19 @ 08:25) (91% - 98%)  Wt(kg): --  I&O's Summary    24 Apr 2019 07:01  -  25 Apr 2019 07:00  --------------------------------------------------------  IN: 1200 mL / OUT: 1230 mL / NET: -30 mL    25 Apr 2019 07:01  -  25 Apr 2019 09:10  --------------------------------------------------------  IN: 0 mL / OUT: 250 mL / NET: -250 mL        Appearance: Normal	  HEENT:   Normal oral mucosa, PERRL, EOMI	  Lymphatic: No lymphadenopathy  Cardiovascular: Normal S1 S2, No JVD, No murmurs, No edema  Respiratory: Lungs clear to auscultation	  Psychiatry: A & O x 3,  Gastrointestinal:  Soft, mildly tender no r/g  Skin: No rashes, No ecchymoses, No cyanosis	  Neurologic: Non-focal  Extremities: Normal range of motion, No clubbing, cyanosis or edema  Vascular: Peripheral pulses palpable 2+ bilaterally    TELEMETRY: 	    ECG:  	  RADIOLOGY:  OTHER: 	  	  LABS:	 	    CARDIAC MARKERS:                                8.3    15.10 )-----------( 206      ( 25 Apr 2019 05:58 )             30.4     04-25    140  |  104  |  16  ----------------------------<  102<H>  3.7   |  26  |  0.73    Ca    8.9      25 Apr 2019 05:58  Phos  2.5     04-25  Mg     2.0     04-25    TPro  4.6<L>  /  Alb  3.0<L>  /  TBili  0.5  /  DBili  x   /  AST  86<H>  /  ALT  80<H>  /  AlkPhos  56  04-25    proBNP:   Lipid Profile:   HgA1c:   TSH:

## 2019-04-25 NOTE — PROGRESS NOTE ADULT - ASSESSMENT
89F with colon cancer s/p extended right colectomy with primary stapled anastomosis and microwave ablation of liver lesion (4/23)    - pain control as needed  - c/w CLD  - monitor GI fxn  - d/c khan and f/u TOV  - OOB and ambulating as tolerating  - PT-->home with home PT and rolling walker    D Team  [id number]

## 2019-04-25 NOTE — PROGRESS NOTE ADULT - SUBJECTIVE AND OBJECTIVE BOX
Subjective: Patient is a 89y old  Female  with hypertension Copd hyperlipidemia who presents with a chief complaint weight loss anemia i performed a colonoscopy  found iron deficency anemia obstructing tumor hepatic flexure day 2 post op  stable   PAST MEDICAL & SURGICAL HISTORY:  Anemia  Hearing deficit, bilateral  Hepatic lesion  Disease of intestine  HTN (hypertension)  Hyperlipidemia  Glaucoma  COPD (chronic obstructive pulmonary disease)  H/O blepharoplasty: BILATERAL  S/P eye surgery: s/p corneal transplant, right  Bilateral cataracts      Allergies    No Known Allergies    Intolerances        MEDICATIONS  (STANDING):  lactated ringers. 1000 milliLiter(s) (30 mL/Hr) IV Continuous <Continuous>  sodium chloride 0.9% lock flush 3 milliLiter(s) IV Push every 8 hours    MEDICATIONS  (PRN):      CONSTITUTIONAL: No fever,20lb some  fatigue  EYES: No eye pain, visual disturbances, or discharge  ENMT:  No difficulty hearing, tinnitus, vertigo; No sinus or throat pain  NECK: No pain or stiffness  BREASTS: No pain, masses, or nipple discharge  RESPIRATORY: No cough, wheezing, chills or hemoptysis; No shortness of breath  CARDIOVASCULAR: No chest pain, palpitations, dizziness, or leg swelling  GASTROINTESTINAL: No abdominal or epigastric pain. No nausea, vomiting, or hematemesis; No diarrhea or constipation. No melena or hematochezia.  GENITOURINARY: No dysuria, frequency, hematuria, or incontinence  NEUROLOGICAL: No headaches, memory loss, loss of strength, numbness, or tremors  SKIN: No itching, burning, rashes, or lesions   LYMPH NODES: No enlarged glands  ENDOCRINE: No heat or cold intolerance; No hair loss  MUSCULOSKELETAL: No joint pain or swelling; No muscle, back, or extremity pain  PSYCHIATRIC: No depression, anxiety, mood swings, or difficulty sleeping  HEME/LYMPH: No easy bruising, or bleeding gums  ALLERY AND IMMUNOLOGIC: No hives or eczema      PHYSICAL EXAM:    GENERAL: Comfortable, no acute distress pale HEAD:  Normocephalic, atraumatic afebrile vs stable   EYES: EOMI, PERRLA  HEENT: Moist mucous membranes  NECK: Supple, No JVD  NERVOUS SYSTEM:  Alert & Oriented X3, Motor Strength 5/5 B/L upper and lower extremities  CHEST/LUNG: Clear to auscultation bilaterally  HEART: Regular rate and rhythm  ABDOMEN: Soft, Nontender,distended, dressed  GENITOURINARY: Voiding, no palpable bladder  EXTREMITIES:   No clubbing, cyanosis, or edema  MUSCULOSKELTAL- No muscle tenderness, no joint tenderness  SKIN-no rash    guiac positive stools  p88     Basic Metabolic Panel w/Mg &amp; Inorg Phos (04.24.19 @ 05:11)    Calcium, Total Serum: 9.4 mg/dL    Phosphorus Level, Serum: 4.2 mg/dL    eGFR if : 68 mL/min    eGFR if Non : 59: The units for eGFR are ml/min/1.73m2 (normalized body  surface area). The eGFR is calculated from a serum  creatinine using the CKD-EPI equation. Other variables  required for calculation are race, age and sex. Among  patients with chronic kidney disease (CKD), the eGFR is  useful in determining the stage of disease according to  KDOQI CKD classification. All eGFR results are reported  numerically with the following interpretation.    GFR  (ml/min/1.73 m2)          W/KIDNEY DAMAGE    W/O KIDNEY DMG  ==========================================================  >= 90.......................Stage 1..............Normal  60-89.......................Stage 2...........Decreased GFR  30-59.......................Stage 3..............Stage 3  15-29.......................Stage 4..............Stage 4  < 15........................Stage 5..............Stage 5    Each stage of CKD assumes that the associated GFR level  has been in effect for at least 3 months. Determination of  stages one and two (with eGFR > 59ml/min/m2) requires  estimation of kidney damage for at least 3 months as  defined by structural or functional abnormalities.    Limitations: All estimates of GFR will be less accurate  for patients at extremes of muscle mass (including but  not limited to frail elderly, critically ill, or cancer  patients), those with unusual diets, and those with  conditions associated with reduced secretion or  extrarenal elimination of creatinine. The eGFR equation  is not recommended for use in patients with unstable  creatinine levels. mL/min    Sodium, Serum: 142 mmol/L    Potassium, Serum: 3.9 mmol/L    Chloride, Serum: 104 mmol/L    Carbon Dioxide, Serum: 20 mmol/L    Anion Gap, Serum: 18 mmo/L    Blood Urea Nitrogen, Serum: 24 mg/dL    Creatinine, Serum: 0.86 mg/dL    Glucose, Serum: 146 mg/dL    Magnesium, Serum: 1.9 mg/dL    Complete Blood Count in AM (04.24.19 @ 05:11)    WBC Count: 18.91 K/uL    RBC Count: 4.41 M/uL    Hemoglobin: 8.7 g/dL    Hematocrit: 30.6 %    Mean Cell Volume: 69.4 fL    Mean Cell Hemoglobin: 19.7 pg    Mean Cell Hemoglobin Conc: 28.4 %    Red Cell Distrib Width: 22.6 %    Platelet Count - Automated: 277 K/uL    MPV: 11.1 fl    Nucleated RBC #: 0 K/uL          cea 31  low iron 13   folate nl  iron deficiency anemia                    :    Impression / Day2 status post hemicolectomy for ca colon    pain control dvt prophylaxis khan dcd oob with assistance  await return gi function

## 2019-04-25 NOTE — PROGRESS NOTE ADULT - ASSESSMENT
89 y/o female with colon cancer s/p extended right colectomy with primary anastomosis and microwave ablation of liver lesion     - pain control  diet as per sx  leukocytosis likely reactive  monitor   oob  dvt proph  copd stable  htn stable  resume meds   analgesicspt

## 2019-04-25 NOTE — PROGRESS NOTE ADULT - SUBJECTIVE AND OBJECTIVE BOX
Anesthesia Pain Management Service: Day 3__ of Epidural    SUBJECTIVE: Patient doing well with PCEA and no problems.  Pain Scale Score:   Refer to charted pain scores    THERAPY:  [x ] Epidural Bupivacaine 0.0625% and Hydromorphone  		[ X] 10 micrograms/mL	[ ] 5 micrograms/mL  [ ] Epidural Bupivacaine 0.0625% and Fentanyl - 2 micrograms/mL  [ ] Epidural Ropivacaine 0.1% plain – 1 mg/mL  [ ] Patient Controlled Regional Anesthesia (PCRA) Ropivacaine  		[ ] 0.2%			[ ] 0.1%    Demand dose __3_ lockout __15_ (minutes) Continuous Rate _4__ Total: __97.4__ ml used (in past 24 hours)      MEDICATIONS  (STANDING):  atorvastatin 20 milliGRAM(s) Oral at bedtime  dextrose 5% + sodium chloride 0.9% with potassium chloride 20 mEq/L 1000 milliLiter(s) (50 mL/Hr) IV Continuous <Continuous>  heparin  Injectable 5000 Unit(s) SubCutaneous every 12 hours  hydrochlorothiazide 25 milliGRAM(s) Oral daily  HYDROmorphone (10 MICROgram(s)/mL) + BUpivacaine 0.0625% in 0.9% Sodium Chloride PCEA 250 milliLiter(s) Epidural PCA Continuous  latanoprost 0.005% Ophthalmic Solution 1 Drop(s) Both EYES at bedtime  losartan 50 milliGRAM(s) Oral daily  potassium chloride    Tablet ER 20 milliEquivalent(s) Oral every 2 hours    MEDICATIONS  (PRN):  ALBUTerol/ipratropium for Nebulization. 3 milliLiter(s) Nebulizer every 6 hours PRN Shortness of Breath and/or Wheezing  HYDROmorphone  Injectable 0.5 milliGRAM(s) IV Push every 3 hours PRN severe breakthrough pain  HYDROmorphone (10 MICROgram(s)/mL) + BUpivacaine 0.0625% in 0.9% Sodium Chloride PCEA Rescue Clinician  Bolus 4 milliLiter(s) Epidural every 15 minutes PRN for Pain Score greater than 6  naloxone Injectable 0.1 milliGRAM(s) IV Push every 3 minutes PRN For ANY of the following changes in patient status:  A. RR LESS THAN 10 breaths per minute, B. Oxygen saturation LESS THAN 90%, C. Sedation score of 6  naloxone Injectable 0.1 milliGRAM(s) IV Push every 3 minutes PRN For ANY of the following changes in patient status:  A. RR LESS THAN 10 breaths per minute, B. Oxygen saturation LESS THAN 90%, C. Sedation score of 6  ondansetron Injectable 4 milliGRAM(s) IV Push every 6 hours PRN Nausea  ondansetron Injectable 4 milliGRAM(s) IV Push once PRN Nausea and/or Vomiting  ondansetron Injectable 4 milliGRAM(s) IV Push every 6 hours PRN Nausea      OBJECTIVE: laying in bed     Assessment of Catheter Site:	[ ] Left	[ ] Right  [x ] Epidural 	[ ] Femoral	      [ ] Saphenous   [ ] Supraclavicular   [ ] Other:    [x ] Dressing intact	[x ] Site non-tender	[ x] Site without erythema, discharge, edema  [x ] Epidural tubing and connection checked	[x] Gross neurological exam within normal limits  [ ] Catheter removed – tip intact		[ ] Afebrile  	[ ] Febrile: ___   [ X] see Temp under VS below)    PT/INR - ( 25 Apr 2019 05:58 )   PT: 12.9 SEC;   INR: 1.13          PTT - ( 25 Apr 2019 05:58 )  PTT:47.9 SEC                      8.3    15.10 )-----------( 206      ( 25 Apr 2019 05:58 )             30.4     Vital Signs Last 24 Hrs  T(C): 36.9 (04-25-19 @ 08:25), Max: 37.1 (04-25-19 @ 00:14)  T(F): 98.4 (04-25-19 @ 08:25), Max: 98.7 (04-25-19 @ 00:14)  HR: 84 (04-25-19 @ 08:25) (81 - 102)  BP: 147/64 (04-25-19 @ 08:25) (112/37 - 147/64)  BP(mean): --  RR: 18 (04-25-19 @ 08:25) (18 - 18)  SpO2: 91% (04-25-19 @ 08:25) (91% - 98%)      Sedation Score:	[x ] Alert	[ ] Drowsy	[ ] Arousable	[ ] Asleep	[ ] Unresponsive    Side Effects:	[x ] None	[ ] Nausea	[ ] Vomiting	[ ] Pruritus  		[ ] Weakness		[ ] Numbness	[ ] Other:    ASSESSMENT/ PLAN:    Therapy to  be:	[x ] Continue   [ ] Discontinued   [ ] Change to prn Analgesics    Documentation and Verification of current medications:  [ X ] Done	[ ] Not done, not eligible, reason:    Comments: Doing OK with epidural and may continue.

## 2019-04-26 ENCOUNTER — TRANSCRIPTION ENCOUNTER (OUTPATIENT)
Age: 84
End: 2019-04-26

## 2019-04-26 LAB
ANION GAP SERPL CALC-SCNC: 10 MMO/L — SIGNIFICANT CHANGE UP (ref 7–14)
ANION GAP SERPL CALC-SCNC: 12 MMO/L — SIGNIFICANT CHANGE UP (ref 7–14)
APTT BLD: 58.7 SEC — HIGH (ref 27.5–36.3)
BUN SERPL-MCNC: 7 MG/DL — SIGNIFICANT CHANGE UP (ref 7–23)
BUN SERPL-MCNC: 9 MG/DL — SIGNIFICANT CHANGE UP (ref 7–23)
CALCIUM SERPL-MCNC: 9.3 MG/DL — SIGNIFICANT CHANGE UP (ref 8.4–10.5)
CALCIUM SERPL-MCNC: 9.4 MG/DL — SIGNIFICANT CHANGE UP (ref 8.4–10.5)
CHLORIDE SERPL-SCNC: 97 MMOL/L — LOW (ref 98–107)
CHLORIDE SERPL-SCNC: 99 MMOL/L — SIGNIFICANT CHANGE UP (ref 98–107)
CO2 SERPL-SCNC: 27 MMOL/L — SIGNIFICANT CHANGE UP (ref 22–31)
CO2 SERPL-SCNC: 29 MMOL/L — SIGNIFICANT CHANGE UP (ref 22–31)
CREAT SERPL-MCNC: 0.64 MG/DL — SIGNIFICANT CHANGE UP (ref 0.5–1.3)
CREAT SERPL-MCNC: 0.69 MG/DL — SIGNIFICANT CHANGE UP (ref 0.5–1.3)
GLUCOSE SERPL-MCNC: 104 MG/DL — HIGH (ref 70–99)
GLUCOSE SERPL-MCNC: 133 MG/DL — HIGH (ref 70–99)
HCT VFR BLD CALC: 31.6 % — LOW (ref 34.5–45)
HCT VFR BLD CALC: 34.1 % — LOW (ref 34.5–45)
HGB BLD-MCNC: 8.7 G/DL — LOW (ref 11.5–15.5)
HGB BLD-MCNC: 9.7 G/DL — LOW (ref 11.5–15.5)
INR BLD: 1.12 — SIGNIFICANT CHANGE UP (ref 0.88–1.17)
MAGNESIUM SERPL-MCNC: 1.7 MG/DL — SIGNIFICANT CHANGE UP (ref 1.6–2.6)
MAGNESIUM SERPL-MCNC: 2 MG/DL — SIGNIFICANT CHANGE UP (ref 1.6–2.6)
MCHC RBC-ENTMCNC: 19.5 PG — LOW (ref 27–34)
MCHC RBC-ENTMCNC: 19.6 PG — LOW (ref 27–34)
MCHC RBC-ENTMCNC: 27.5 % — LOW (ref 32–36)
MCHC RBC-ENTMCNC: 28.4 % — LOW (ref 32–36)
MCV RBC AUTO: 68.9 FL — LOW (ref 80–100)
MCV RBC AUTO: 70.9 FL — LOW (ref 80–100)
NRBC # FLD: 0 K/UL — SIGNIFICANT CHANGE UP (ref 0–0)
NRBC # FLD: 0 K/UL — SIGNIFICANT CHANGE UP (ref 0–0)
PHOSPHATE SERPL-MCNC: 2.2 MG/DL — LOW (ref 2.5–4.5)
PHOSPHATE SERPL-MCNC: 2.9 MG/DL — SIGNIFICANT CHANGE UP (ref 2.5–4.5)
PLATELET # BLD AUTO: 202 K/UL — SIGNIFICANT CHANGE UP (ref 150–400)
PLATELET # BLD AUTO: 223 K/UL — SIGNIFICANT CHANGE UP (ref 150–400)
PMV BLD: 10.1 FL — SIGNIFICANT CHANGE UP (ref 7–13)
PMV BLD: SIGNIFICANT CHANGE UP FL (ref 7–13)
POTASSIUM SERPL-MCNC: 3.5 MMOL/L — SIGNIFICANT CHANGE UP (ref 3.5–5.3)
POTASSIUM SERPL-MCNC: 3.7 MMOL/L — SIGNIFICANT CHANGE UP (ref 3.5–5.3)
POTASSIUM SERPL-SCNC: 3.5 MMOL/L — SIGNIFICANT CHANGE UP (ref 3.5–5.3)
POTASSIUM SERPL-SCNC: 3.7 MMOL/L — SIGNIFICANT CHANGE UP (ref 3.5–5.3)
PROTHROM AB SERPL-ACNC: 12.5 SEC — SIGNIFICANT CHANGE UP (ref 9.8–13.1)
RBC # BLD: 4.46 M/UL — SIGNIFICANT CHANGE UP (ref 3.8–5.2)
RBC # BLD: 4.95 M/UL — SIGNIFICANT CHANGE UP (ref 3.8–5.2)
RBC # FLD: 22.4 % — HIGH (ref 10.3–14.5)
RBC # FLD: 22.7 % — HIGH (ref 10.3–14.5)
SODIUM SERPL-SCNC: 136 MMOL/L — SIGNIFICANT CHANGE UP (ref 135–145)
SODIUM SERPL-SCNC: 138 MMOL/L — SIGNIFICANT CHANGE UP (ref 135–145)
TROPONIN T, HIGH SENSITIVITY: 19 NG/L — SIGNIFICANT CHANGE UP (ref ?–14)
WBC # BLD: 11.96 K/UL — HIGH (ref 3.8–10.5)
WBC # BLD: 14.14 K/UL — HIGH (ref 3.8–10.5)
WBC # FLD AUTO: 11.96 K/UL — HIGH (ref 3.8–10.5)
WBC # FLD AUTO: 14.14 K/UL — HIGH (ref 3.8–10.5)

## 2019-04-26 PROCEDURE — 93010 ELECTROCARDIOGRAM REPORT: CPT

## 2019-04-26 RX ORDER — OXYCODONE HYDROCHLORIDE 5 MG/1
10 TABLET ORAL
Qty: 0 | Refills: 0 | Status: DISCONTINUED | OUTPATIENT
Start: 2019-04-26 | End: 2019-04-26

## 2019-04-26 RX ORDER — OXYCODONE HYDROCHLORIDE 5 MG/1
5 TABLET ORAL
Qty: 0 | Refills: 0 | Status: DISCONTINUED | OUTPATIENT
Start: 2019-04-26 | End: 2019-05-01

## 2019-04-26 RX ORDER — PANTOPRAZOLE SODIUM 20 MG/1
40 TABLET, DELAYED RELEASE ORAL
Qty: 0 | Refills: 0 | Status: DISCONTINUED | OUTPATIENT
Start: 2019-04-26 | End: 2019-05-01

## 2019-04-26 RX ORDER — CALCIUM CARBONATE 500(1250)
1 TABLET ORAL ONCE
Qty: 0 | Refills: 0 | Status: COMPLETED | OUTPATIENT
Start: 2019-04-26 | End: 2019-04-26

## 2019-04-26 RX ORDER — POTASSIUM PHOSPHATE, MONOBASIC POTASSIUM PHOSPHATE, DIBASIC 236; 224 MG/ML; MG/ML
15 INJECTION, SOLUTION INTRAVENOUS ONCE
Qty: 0 | Refills: 0 | Status: COMPLETED | OUTPATIENT
Start: 2019-04-26 | End: 2019-04-26

## 2019-04-26 RX ORDER — FAMOTIDINE 10 MG/ML
20 INJECTION INTRAVENOUS DAILY
Qty: 0 | Refills: 0 | Status: DISCONTINUED | OUTPATIENT
Start: 2019-04-26 | End: 2019-05-01

## 2019-04-26 RX ORDER — MAGNESIUM SULFATE 500 MG/ML
2 VIAL (ML) INJECTION ONCE
Qty: 0 | Refills: 0 | Status: COMPLETED | OUTPATIENT
Start: 2019-04-26 | End: 2019-04-26

## 2019-04-26 RX ADMIN — ATORVASTATIN CALCIUM 20 MILLIGRAM(S): 80 TABLET, FILM COATED ORAL at 21:13

## 2019-04-26 RX ADMIN — LATANOPROST 1 DROP(S): 0.05 SOLUTION/ DROPS OPHTHALMIC; TOPICAL at 21:13

## 2019-04-26 RX ADMIN — PANTOPRAZOLE SODIUM 40 MILLIGRAM(S): 20 TABLET, DELAYED RELEASE ORAL at 06:40

## 2019-04-26 RX ADMIN — POTASSIUM PHOSPHATE, MONOBASIC POTASSIUM PHOSPHATE, DIBASIC 62.5 MILLIMOLE(S): 236; 224 INJECTION, SOLUTION INTRAVENOUS at 12:00

## 2019-04-26 RX ADMIN — Medication 25 MILLIGRAM(S): at 05:39

## 2019-04-26 RX ADMIN — HEPARIN SODIUM 5000 UNIT(S): 5000 INJECTION INTRAVENOUS; SUBCUTANEOUS at 17:06

## 2019-04-26 RX ADMIN — FAMOTIDINE 20 MILLIGRAM(S): 10 INJECTION INTRAVENOUS at 17:06

## 2019-04-26 RX ADMIN — LOSARTAN POTASSIUM 50 MILLIGRAM(S): 100 TABLET, FILM COATED ORAL at 05:39

## 2019-04-26 RX ADMIN — HEPARIN SODIUM 5000 UNIT(S): 5000 INJECTION INTRAVENOUS; SUBCUTANEOUS at 05:39

## 2019-04-26 RX ADMIN — Medication 50 GRAM(S): at 11:59

## 2019-04-26 RX ADMIN — Medication 1 TABLET(S): at 14:55

## 2019-04-26 NOTE — PROGRESS NOTE ADULT - SUBJECTIVE AND OBJECTIVE BOX
Anesthesia Pain Management Service: Day 4__ of Epidural    SUBJECTIVE: Patient doing well with PCEA and no problems.  Pain Scale Score: 0/10   Refer to charted pain scores    THERAPY:  [x ] Epidural Bupivacaine 0.0625% and Hydromorphone  		[ X] 10 micrograms/mL	[ ] 5 micrograms/mL  [ ] Epidural Bupivacaine 0.0625% and Fentanyl - 2 micrograms/mL  [ ] Epidural Ropivacaine 0.1% plain – 1 mg/mL  [ ] Patient Controlled Regional Anesthesia (PCRA) Ropivacaine  		[ ] 0.2%			[ ] 0.1%    Demand dose __3_ lockout __15_ (minutes) Continuous Rate 4__ Total: 98.4____ ml used (in past 24 hours)      MEDICATIONS  (STANDING):  atorvastatin 20 milliGRAM(s) Oral at bedtime  dextrose 5% + sodium chloride 0.9% with potassium chloride 20 mEq/L 1000 milliLiter(s) (50 mL/Hr) IV Continuous <Continuous>  heparin  Injectable 5000 Unit(s) SubCutaneous every 12 hours  hydrochlorothiazide 25 milliGRAM(s) Oral daily  HYDROmorphone (10 MICROgram(s)/mL) + BUpivacaine 0.0625% in 0.9% Sodium Chloride PCEA 250 milliLiter(s) Epidural PCA Continuous  latanoprost 0.005% Ophthalmic Solution 1 Drop(s) Both EYES at bedtime  losartan 50 milliGRAM(s) Oral daily  magnesium sulfate  IVPB 2 Gram(s) IV Intermittent once  pantoprazole    Tablet 40 milliGRAM(s) Oral before breakfast  potassium phosphate IVPB 15 milliMole(s) IV Intermittent once    MEDICATIONS  (PRN):  ALBUTerol/ipratropium for Nebulization. 3 milliLiter(s) Nebulizer every 6 hours PRN Shortness of Breath and/or Wheezing  HYDROmorphone  Injectable 0.5 milliGRAM(s) IV Push every 3 hours PRN severe breakthrough pain  HYDROmorphone (10 MICROgram(s)/mL) + BUpivacaine 0.0625% in 0.9% Sodium Chloride PCEA Rescue Clinician  Bolus 4 milliLiter(s) Epidural every 15 minutes PRN for Pain Score greater than 6  naloxone Injectable 0.1 milliGRAM(s) IV Push every 3 minutes PRN For ANY of the following changes in patient status:  A. RR LESS THAN 10 breaths per minute, B. Oxygen saturation LESS THAN 90%, C. Sedation score of 6  naloxone Injectable 0.1 milliGRAM(s) IV Push every 3 minutes PRN For ANY of the following changes in patient status:  A. RR LESS THAN 10 breaths per minute, B. Oxygen saturation LESS THAN 90%, C. Sedation score of 6  ondansetron Injectable 4 milliGRAM(s) IV Push every 6 hours PRN Nausea  ondansetron Injectable 4 milliGRAM(s) IV Push once PRN Nausea and/or Vomiting  ondansetron Injectable 4 milliGRAM(s) IV Push every 6 hours PRN Nausea      OBJECTIVE:  Patient sitting up in chair, smiling and comfortable.    Assessment of Catheter Site:	[ ] Left	[ ] Right  [x ] Epidural 	[ ] Femoral	      [ ] Saphenous   [ ] Supraclavicular   [ ] Other:    [x ] Dressing intact	[x ] Site non-tender	[ x] Site without erythema, discharge, edema  [x ] Epidural tubing and connection checked	[x] Gross neurological exam within normal limits  [X ] Catheter removed – tip intact		[ ] Afebrile  	[ ] Febrile: ___   [ X] see Temp under VS below)    PT/INR - ( 26 Apr 2019 05:20 )   PT: 12.5 SEC;   INR: 1.12          PTT - ( 26 Apr 2019 05:20 )  PTT:58.7 SEC                      8.7    11.96 )-----------( 202      ( 26 Apr 2019 05:20 )             31.6     Vital Signs Last 24 Hrs  T(C): 36.7 (04-26-19 @ 09:30), Max: 37 (04-25-19 @ 21:15)  T(F): 98.1 (04-26-19 @ 09:30), Max: 98.6 (04-25-19 @ 21:15)  HR: 79 (04-26-19 @ 09:30) (79 - 97)  BP: 142/56 (04-26-19 @ 09:30) (142/56 - 149/-)  BP(mean): --  RR: 18 (04-26-19 @ 09:30) (18 - 18)  SpO2: 98% (04-26-19 @ 09:30) (93% - 98%)      Sedation Score:	[x ] Alert	[ ] Drowsy	[ ] Arousable	[ ] Asleep	[ ] Unresponsive    Side Effects:	[x ] None	[ ] Nausea	[ ] Vomiting	[ ] Pruritus  		[ ] Weakness		[ ] Numbness	[ ] Other:    ASSESSMENT/ PLAN:    Therapy to  be:	[ ] Continue   [ X] Discontinued   [ ] Change to prn Analgesics    Documentation and Verification of current medications:  [ X ] Done	[ ] Not done, not eligible, reason:    Comments: PTT found to be elevated. Anesthesia attending notified.  Patient's PTT elevated prior to surgery. Dr. Yancey discussed case with anesthesiologist who placed epidural. As per Dr. Bonds (Anesthesiologist), Dr. Harvey (Heme-onc) had a mixing study done prior to placement of epidural and the study did not find that the patient had a bleeding disorder, and that it was likely that a circulating anticoagulant was interfering with the PTT assay.  Note can be found in shadow chart at nursing station. Coags checked, and ok to discontinue Epidural as per anesthesia attending. Discontinued PCEA as per team request without incident.

## 2019-04-26 NOTE — DISCHARGE NOTE NURSING/CASE MANAGEMENT/SOCIAL WORK - [email or web address]_GEN_[email or web address]_CORE
You can access the "[employer] Patient Portal, offered by [facility], by registering with the following website: [email or web address]

## 2019-04-26 NOTE — PROGRESS NOTE ADULT - ASSESSMENT
89F with colon cancer s/p extended right colectomy with primary stapled anastomosis and microwave ablation of liver lesion (4/23)    - pain control  - d/c PCA  - c/w CLD  - monitor GI fxn  - f/u UOP  - OOB and ambulating as tolerating  - PT-->home with home PT and rolling walker    D Team  [id number]

## 2019-04-26 NOTE — PROGRESS NOTE ADULT - SUBJECTIVE AND OBJECTIVE BOX
Surgery Progress Note      Subjective: Patient seen and examined.   Mathews removed yesterday.  Required straight cath x1 for urinary retention     T(C): 36.7 (04-26-19 @ 09:30), Max: 37 (04-25-19 @ 21:15)  HR: 79 (04-26-19 @ 09:30) (79 - 97)  BP: 142/56 (04-26-19 @ 09:30) (142/56 - 149/-)  RR: 18 (04-26-19 @ 09:30) (18 - 18)  SpO2: 98% (04-26-19 @ 09:30) (93% - 98%)      04-25-19 @ 07:01  -  04-26-19 @ 07:00  --------------------------------------------------------  IN: 0 mL / OUT: 2150 mL / NET: -2150 mL    04-26-19 @ 07:01  -  04-26-19 @ 10:22  --------------------------------------------------------  IN: 0 mL / OUT: 125 mL / NET: -125 mL        Physical Exam:   General: NAD  Abdomen: soft, appropriately tender, midline incision with louis    Labs:                        8.7    11.96 )-----------( 202      ( 26 Apr 2019 05:20 )             31.6     04-26    138  |  99  |  9   ----------------------------<  104<H>  3.7   |  29  |  0.69    Ca    9.3      26 Apr 2019 05:20  Phos  2.2     04-26  Mg     1.7     04-26    TPro  4.6<L>  /  Alb  3.0<L>  /  TBili  0.5  /  DBili  x   /  AST  86<H>  /  ALT  80<H>  /  AlkPhos  56  04-25        Medications:     ALBUTerol/ipratropium for Nebulization. 3 milliLiter(s) Nebulizer every 6 hours PRN  atorvastatin 20 milliGRAM(s) Oral at bedtime  dextrose 5% + sodium chloride 0.9% with potassium chloride 20 mEq/L 1000 milliLiter(s) IV Continuous <Continuous>  heparin  Injectable 5000 Unit(s) SubCutaneous every 12 hours  hydrochlorothiazide 25 milliGRAM(s) Oral daily  HYDROmorphone  Injectable 0.5 milliGRAM(s) IV Push every 3 hours PRN  HYDROmorphone (10 MICROgram(s)/mL) + BUpivacaine 0.0625% in 0.9% Sodium Chloride PCEA 250 milliLiter(s) Epidural PCA Continuous  HYDROmorphone (10 MICROgram(s)/mL) + BUpivacaine 0.0625% in 0.9% Sodium Chloride PCEA Rescue Clinician  Bolus 4 milliLiter(s) Epidural every 15 minutes PRN  latanoprost 0.005% Ophthalmic Solution 1 Drop(s) Both EYES at bedtime  losartan 50 milliGRAM(s) Oral daily  magnesium sulfate  IVPB 2 Gram(s) IV Intermittent once  naloxone Injectable 0.1 milliGRAM(s) IV Push every 3 minutes PRN  naloxone Injectable 0.1 milliGRAM(s) IV Push every 3 minutes PRN  ondansetron Injectable 4 milliGRAM(s) IV Push every 6 hours PRN  ondansetron Injectable 4 milliGRAM(s) IV Push once PRN  ondansetron Injectable 4 milliGRAM(s) IV Push every 6 hours PRN  pantoprazole    Tablet 40 milliGRAM(s) Oral before breakfast  potassium phosphate IVPB 15 milliMole(s) IV Intermittent once      Radiographs: No new imaging

## 2019-04-26 NOTE — PROGRESS NOTE ADULT - SUBJECTIVE AND OBJECTIVE BOX
Anesthesia Pain Management Service    SUBJECTIVE: Pt doing well with PCEA removed & no problems reported.    Therapy:	  [ ] IV PCA	   [ X] Epidural stopped          [ ] s/p Spinal Opoid              [ ] Postpartum infusion	  [ ] Patient controlled regional anesthesia (PCRA)    [ ] prn Analgesics    Allergies    No Known Allergies    Intolerances      MEDICATIONS  (STANDING):  atorvastatin 20 milliGRAM(s) Oral at bedtime  dextrose 5% + sodium chloride 0.9% with potassium chloride 20 mEq/L 1000 milliLiter(s) (50 mL/Hr) IV Continuous <Continuous>  heparin  Injectable 5000 Unit(s) SubCutaneous every 12 hours  hydrochlorothiazide 25 milliGRAM(s) Oral daily  latanoprost 0.005% Ophthalmic Solution 1 Drop(s) Both EYES at bedtime  losartan 50 milliGRAM(s) Oral daily  pantoprazole    Tablet 40 milliGRAM(s) Oral before breakfast    MEDICATIONS  (PRN):  ALBUTerol/ipratropium for Nebulization. 3 milliLiter(s) Nebulizer every 6 hours PRN Shortness of Breath and/or Wheezing  ondansetron Injectable 4 milliGRAM(s) IV Push every 6 hours PRN Nausea  ondansetron Injectable 4 milliGRAM(s) IV Push once PRN Nausea and/or Vomiting  ondansetron Injectable 4 milliGRAM(s) IV Push every 6 hours PRN Nausea  oxyCODONE    IR 5 milliGRAM(s) Oral every 3 hours PRN Moderate Pain (4 - 6)  oxyCODONE    IR 10 milliGRAM(s) Oral every 3 hours PRN Severe Pain (7 - 10)      OBJECTIVE:   [X] No new signs     [ ] Other:    Side Effects:  [X ] None			[ ] Other:    Assessment of Catheter Site:		[ ] Intact		[ ] Other:    ASSESSMENT/PLAN  [ ] Continue current therapy    [X ] Therapy changed to:    [ ] IV PCA       [ ] Epidural     [ X] prn Analgesics     Comments: Epidural stopped & now to go on oral/IV opioids and/or non-opioid Adjuvant analgesics to be used at this point.     Progress Note written now but Patient was seen earlier.

## 2019-04-26 NOTE — PROGRESS NOTE ADULT - SUBJECTIVE AND OBJECTIVE BOX
Subjective: Patient is a 89y old  Female  with hypertension Copd hyperlipidemia who presents with a chief complaint weight loss anemia i performed a colonoscopy  found iron deficency anemia obstructing tumor hepatic flexure day 3 post op  long discussion with patient wants to go home with therapy at home PAST MEDICAL & SURGICAL HISTORY:  Anemia  Hearing deficit, bilateral  Hepatic lesion  Disease of intestine  HTN (hypertension)  Hyperlipidemia  Glaucoma  COPD (chronic obstructive pulmonary disease)  H/O blepharoplasty: BILATERAL  S/P eye surgery: s/p corneal transplant, right  Bilateral cataracts      Allergies    No Known Allergies    Intolerances        MEDICATIONS  (STANDING):  lactated ringers. 1000 milliLiter(s) (30 mL/Hr) IV Continuous <Continuous>  sodium chloride 0.9% lock flush 3 milliLiter(s) IV Push every 8 hours    MEDICATIONS  (PRN):      CONSTITUTIONAL: No fever,20lb some  fatigue  EYES: No eye pain, visual disturbances, or discharge  ENMT:  No difficulty hearing, tinnitus, vertigo; No sinus or throat pain  NECK: No pain or stiffness  BREASTS: No pain, masses, or nipple discharge  RESPIRATORY: No cough, wheezing, chills or hemoptysis; No shortness of breath  CARDIOVASCULAR: No chest pain, palpitations, dizziness, or leg swelling  GASTROINTESTINAL: No abdominal or epigastric pain. No nausea, vomiting, or hematemesis; No diarrhea or constipation. No melena or hematochezia.  GENITOURINARY: No dysuria, frequency, hematuria, or incontinence  NEUROLOGICAL: No headaches, memory loss, loss of strength, numbness, or tremors  SKIN: No itching, burning, rashes, or lesions   LYMPH NODES: No enlarged glands  ENDOCRINE: No heat or cold intolerance; No hair loss  MUSCULOSKELETAL: No joint pain or swelling; No muscle, back, or extremity pain  PSYCHIATRIC: No depression, anxiety, mood swings, or difficulty sleeping  HEME/LYMPH: No easy bruising, or bleeding gums  ALLERY AND IMMUNOLOGIC: No hives or eczema      PHYSICAL EXAM:    GENERAL: Comfortable, no acute distress pale HEAD:  Normocephalic, atraumatic afebrile vs stable   EYES: EOMI, PERRLA  HEENT: Moist mucous membranes  NECK: Supple, No JVD  NERVOUS SYSTEM:  Alert & Oriented X3, Motor Strength 5/5 B/L upper and lower extremities  CHEST/LUNG: Clear to auscultation bilaterally  HEART: Regular rate and rhythm  ABDOMEN: Soft, Nontender,distended,  wound dressed  GENITOURINARY: Voiding, no palpable bladder  EXTREMITIES:   No clubbing, cyanosis, or edema  MUSCULOSKELTAL- No muscle tenderness, no joint tenderness  SKIN-no rash    guiac positive stools  p88   Comprehensive Metabolic, Mg + Phosphorus (04.25.19 @ 05:58)    Phosphorus Level, Serum: 2.5: Delta: 4.2 on 04/24/  Delta: 4.2 on 04/24/ mg/dL    eGFR if : 84 mL/min    eGFR if Non : 72: The units for eGFR are ml/min/1.73m2 (normalized body  surface area). The eGFR is calculated from a serum  creatinine using the CKD-EPI equation. Other variables  required for calculation are race, age and sex. Among  patients with chronic kidney disease (CKD), the eGFR is  useful in determining the stage of disease according to  KDOQI CKD classification. All eGFR results are reported  numerically with the following interpretation.    GFR  (ml/min/1.73 m2)          W/KIDNEY DAMAGE    W/O KIDNEY DMG  ==========================================================  >= 90.......................Stage 1..............Normal  60-89.......................Stage 2...........Decreased GFR  30-59.......................Stage 3..............Stage 3  15-29.......................Stage 4..............Stage 4  < 15........................Stage 5..............Stage 5    Each stage of CKD assumes that the associated GFR level  has been in effect for at least 3 months. Determination of  stages one and two (with eGFR > 59ml/min/m2) requires  estimation of kidney damage for at least 3 months as  defined by structural or functional abnormalities.    Limitations: All estimates of GFR will be less accurate  for patients at extremes of muscle mass (including but  not limited to frail elderly, critically ill, or cancer  patients), those with unusual diets, and those with  conditions associated with reduced secretion or  extrarenal elimination of creatinine. The eGFR equation  is not recommended for use in patients with unstable  creatinine levels. mL/min    Sodium, Serum: 140 mmol/L    Potassium, Serum: 3.7 mmol/L    Chloride, Serum: 104 mmol/L    Carbon Dioxide, Serum: 26 mmol/L    Anion Gap, Serum: 10 mmo/L    Blood Urea Nitrogen, Serum: 16 mg/dL    Creatinine, Serum: 0.73 mg/dL    Glucose, Serum: 102 mg/dL    Calcium, Total Serum: 8.9 mg/dL    Protein Total, Serum: 4.6 g/dL    Albumin, Serum: 3.0 g/dL    Bilirubin Total, Serum: 0.5 mg/dL    Alkaline Phosphatase, Serum: 56 u/L    Aspartate Aminotransferase (AST/SGOT): 86 u/L    Alanine Aminotransferase (ALT/SGPT): 80 u/L    Magnesium, Serum: 2.0 mg/dL    Complete Blood Count in AM (04.25.19 @ 05:58)    WBC Count: 15.10 K/uL    RBC Count: 4.23 M/uL    Hemoglobin: 8.3 g/dL    Hematocrit: 30.4 %    Mean Cell Volume: 71.9 fL    Mean Cell Hemoglobin: 19.6 pg    Mean Cell Hemoglobin Conc: 27.3 %    Red Cell Distrib Width: 22.7 %    Platelet Count - Automated: 206 K/uL    MPV: 10.4 fl    Nucleated RBC #: 0 K/uL                cea 31  low iron 13   folate nl  iron deficiency anemia                    :    Impression / Day3 status post hemicolectomy for ca colon    pain control dvt prophylaxis khan dcd oob with assistance advance diet as tolerated    to monitor anemia advance diet as tolerated discussed with patient she wants to go home with therapy at home

## 2019-04-27 LAB
ANION GAP SERPL CALC-SCNC: 10 MMO/L — SIGNIFICANT CHANGE UP (ref 7–14)
BUN SERPL-MCNC: 8 MG/DL — SIGNIFICANT CHANGE UP (ref 7–23)
CALCIUM SERPL-MCNC: 9.2 MG/DL — SIGNIFICANT CHANGE UP (ref 8.4–10.5)
CHLORIDE SERPL-SCNC: 93 MMOL/L — LOW (ref 98–107)
CO2 SERPL-SCNC: 27 MMOL/L — SIGNIFICANT CHANGE UP (ref 22–31)
CREAT SERPL-MCNC: 0.61 MG/DL — SIGNIFICANT CHANGE UP (ref 0.5–1.3)
GLUCOSE SERPL-MCNC: 117 MG/DL — HIGH (ref 70–99)
HCT VFR BLD CALC: 32.5 % — LOW (ref 34.5–45)
HGB BLD-MCNC: 9.5 G/DL — LOW (ref 11.5–15.5)
MAGNESIUM SERPL-MCNC: 1.8 MG/DL — SIGNIFICANT CHANGE UP (ref 1.6–2.6)
MCHC RBC-ENTMCNC: 19.8 PG — LOW (ref 27–34)
MCHC RBC-ENTMCNC: 29.2 % — LOW (ref 32–36)
MCV RBC AUTO: 67.7 FL — LOW (ref 80–100)
NRBC # FLD: 0 K/UL — SIGNIFICANT CHANGE UP (ref 0–0)
PHOSPHATE SERPL-MCNC: 2.8 MG/DL — SIGNIFICANT CHANGE UP (ref 2.5–4.5)
PLATELET # BLD AUTO: 235 K/UL — SIGNIFICANT CHANGE UP (ref 150–400)
PMV BLD: SIGNIFICANT CHANGE UP FL (ref 7–13)
POTASSIUM SERPL-MCNC: 3.1 MMOL/L — LOW (ref 3.5–5.3)
POTASSIUM SERPL-SCNC: 3.1 MMOL/L — LOW (ref 3.5–5.3)
RBC # BLD: 4.8 M/UL — SIGNIFICANT CHANGE UP (ref 3.8–5.2)
RBC # FLD: 22.8 % — HIGH (ref 10.3–14.5)
SODIUM SERPL-SCNC: 130 MMOL/L — LOW (ref 135–145)
WBC # BLD: 14.11 K/UL — HIGH (ref 3.8–10.5)
WBC # FLD AUTO: 14.11 K/UL — HIGH (ref 3.8–10.5)

## 2019-04-27 RX ORDER — ENOXAPARIN SODIUM 100 MG/ML
40 INJECTION SUBCUTANEOUS DAILY
Qty: 0 | Refills: 0 | Status: DISCONTINUED | OUTPATIENT
Start: 2019-04-27 | End: 2019-05-01

## 2019-04-27 RX ORDER — POTASSIUM CHLORIDE 20 MEQ
40 PACKET (EA) ORAL ONCE
Qty: 0 | Refills: 0 | Status: COMPLETED | OUTPATIENT
Start: 2019-04-27 | End: 2019-04-27

## 2019-04-27 RX ADMIN — HEPARIN SODIUM 5000 UNIT(S): 5000 INJECTION INTRAVENOUS; SUBCUTANEOUS at 05:05

## 2019-04-27 RX ADMIN — LATANOPROST 1 DROP(S): 0.05 SOLUTION/ DROPS OPHTHALMIC; TOPICAL at 21:25

## 2019-04-27 RX ADMIN — DEXTROSE MONOHYDRATE, SODIUM CHLORIDE, AND POTASSIUM CHLORIDE 50 MILLILITER(S): 50; .745; 4.5 INJECTION, SOLUTION INTRAVENOUS at 12:37

## 2019-04-27 RX ADMIN — DEXTROSE MONOHYDRATE, SODIUM CHLORIDE, AND POTASSIUM CHLORIDE 50 MILLILITER(S): 50; .745; 4.5 INJECTION, SOLUTION INTRAVENOUS at 21:25

## 2019-04-27 RX ADMIN — Medication 40 MILLIEQUIVALENT(S): at 12:36

## 2019-04-27 RX ADMIN — Medication 25 MILLIGRAM(S): at 05:05

## 2019-04-27 RX ADMIN — FAMOTIDINE 20 MILLIGRAM(S): 10 INJECTION INTRAVENOUS at 12:36

## 2019-04-27 RX ADMIN — PANTOPRAZOLE SODIUM 40 MILLIGRAM(S): 20 TABLET, DELAYED RELEASE ORAL at 05:06

## 2019-04-27 RX ADMIN — ENOXAPARIN SODIUM 40 MILLIGRAM(S): 100 INJECTION SUBCUTANEOUS at 12:36

## 2019-04-27 RX ADMIN — ATORVASTATIN CALCIUM 20 MILLIGRAM(S): 80 TABLET, FILM COATED ORAL at 21:25

## 2019-04-27 RX ADMIN — LOSARTAN POTASSIUM 50 MILLIGRAM(S): 100 TABLET, FILM COATED ORAL at 05:05

## 2019-04-27 NOTE — PROGRESS NOTE ADULT - SUBJECTIVE AND OBJECTIVE BOX
CHIEF COMPLAINT:Patient is a 89y old  Female who presents with a chief complaint of colon cancer copd high bp (27 Apr 2019 06:42)    	        PAST MEDICAL & SURGICAL HISTORY:  Anemia  Hearing deficit, bilateral  Hepatic lesion  Disease of intestine  HTN (hypertension)  Hyperlipidemia  Glaucoma  COPD (chronic obstructive pulmonary disease)  H/O blepharoplasty: BILATERAL  S/P eye surgery: s/p corneal transplant, right  Bilateral cataracts          REVIEW OF SYSTEMS:  CONSTITUTIONAL: No fever, weight loss, or fatigue  EYES: No eye pain, visual disturbances, or discharge  NECK: No pain or stiffness  RESPIRATORY: No cough, wheezing, chills or hemoptysis; No Shortness of Breath  CARDIOVASCULAR: No chest pain, palpitations, passing out, dizziness, or leg swelling  GASTROINTESTINAL:less abd pain   GENITOURINARY: No dysuria, frequency, hematuria, or incontinence  NEUROLOGICAL: No headaches, memory loss, loss of strength, numbness, or tremors  MUSCULOSKELETAL: No joint pain or swelling; No muscle, back, or extremity pain    Medications:  MEDICATIONS  (STANDING):  atorvastatin 20 milliGRAM(s) Oral at bedtime  dextrose 5% + sodium chloride 0.9% with potassium chloride 20 mEq/L 1000 milliLiter(s) (50 mL/Hr) IV Continuous <Continuous>  enoxaparin Injectable 40 milliGRAM(s) SubCutaneous daily  famotidine    Tablet 20 milliGRAM(s) Oral daily  hydrochlorothiazide 25 milliGRAM(s) Oral daily  latanoprost 0.005% Ophthalmic Solution 1 Drop(s) Both EYES at bedtime  losartan 50 milliGRAM(s) Oral daily  pantoprazole    Tablet 40 milliGRAM(s) Oral before breakfast  potassium chloride    Tablet ER 40 milliEquivalent(s) Oral once    MEDICATIONS  (PRN):  ALBUTerol/ipratropium for Nebulization. 3 milliLiter(s) Nebulizer every 6 hours PRN Shortness of Breath and/or Wheezing  ondansetron Injectable 4 milliGRAM(s) IV Push every 6 hours PRN Nausea  ondansetron Injectable 4 milliGRAM(s) IV Push once PRN Nausea and/or Vomiting  oxyCODONE    IR 5 milliGRAM(s) Oral every 3 hours PRN Moderate Pain (4 - 6)    	    PHYSICAL EXAM:  T(C): 36.6 (04-27-19 @ 08:36), Max: 36.8 (04-27-19 @ 05:03)  HR: 84 (04-27-19 @ 08:36) (79 - 92)  BP: 148/64 (04-27-19 @ 08:36) (142/56 - 160/78)  RR: 18 (04-27-19 @ 08:36) (18 - 19)  SpO2: 92% (04-27-19 @ 08:36) (87% - 98%)  Wt(kg): --  I&O's Summary    26 Apr 2019 07:01  -  27 Apr 2019 07:00  --------------------------------------------------------  IN: 0 mL / OUT: 1675 mL / NET: -1675 mL    27 Apr 2019 07:01  -  27 Apr 2019 09:19  --------------------------------------------------------  IN: 100 mL / OUT: 0 mL / NET: 100 mL        Appearance: Normal	  HEENT:   Normal oral mucosa, PERRL, EOMI	  Lymphatic: No lymphadenopathy  Cardiovascular: Normal S1 S2, No JVD, No murmurs, No edema  Respiratory: Lungs clear to auscultation	  Psychiatry: A & O x 3,  Gastrointestinal:  Soft, tender   Skin: No rashes, No ecchymoses, No cyanosis	  Neurologic: Non-focal  Extremities: Normal range of motion, No clubbing, cyanosis   Vascular: Peripheral pulses palpable 2+ bilaterally    TELEMETRY: 	    ECG:  	  RADIOLOGY:  OTHER: 	  	  LABS:	 	    CARDIAC MARKERS:                                9.5    14.11 )-----------( 235      ( 27 Apr 2019 05:12 )             32.5     04-27    130<L>  |  93<L>  |  8   ----------------------------<  117<H>  3.1<L>   |  27  |  0.61    Ca    9.2      27 Apr 2019 05:12  Phos  2.8     04-27  Mg     1.8     04-27      proBNP:   Lipid Profile:   HgA1c:   TSH:

## 2019-04-27 NOTE — PROGRESS NOTE ADULT - ASSESSMENT
89 y/o female with colon cancer s/p extended right colectomy with primary anastomosis and microwave ablation of liver lesion     - pain control  diet as per sx  leukocytosis better  monitor   oob  dvt proph  copd stable  htn stable  resumed meds   hyponatremia  repeat bmp  may need to change fluids ns and stop hctz  hypokalemia  supp k +  analgesics  pt  ppi

## 2019-04-27 NOTE — PROGRESS NOTE ADULT - ASSESSMENT
89F with colon cancer s/p extended right colectomy with primary stapled anastomosis and microwave ablation of liver lesion (4/23)    - pain control  - c/w CLD  - monitor GI fxn  - f/u UOP  - OOB and ambulating as tolerating  - PT-->home with home PT and rolling walker    D Team  [id number]

## 2019-04-27 NOTE — PROGRESS NOTE ADULT - SUBJECTIVE AND OBJECTIVE BOX
Surgery Progress Note      Subjective: Patient seen and examined. No acute events overnight. complaining of heartburn    T(C): 36.7 (04-27-19 @ 00:46), Max: 36.7 (04-26-19 @ 05:37)  HR: 91 (04-27-19 @ 00:46) (79 - 92)  BP: 154/73 (04-27-19 @ 00:12) (142/56 - 160/78)  RR: 18 (04-27-19 @ 00:46) (18 - 19)  SpO2: 98% (04-27-19 @ 00:46) (87% - 98%)      04-25-19 @ 07:01  -  04-26-19 @ 07:00  --------------------------------------------------------  IN: 0 mL / OUT: 2150 mL / NET: -2150 mL    04-26-19 @ 07:01  -  04-27-19 @ 01:02  --------------------------------------------------------  IN: 0 mL / OUT: 1425 mL / NET: -1425 mL        Physical Exam:     General: NAD  Abdomen: soft, appropriately tender, midline incision with louis    Labs:                          9.7    14.14 )-----------( 223      ( 26 Apr 2019 17:27 )             34.1     04-26    136  |  97<L>  |  7   ----------------------------<  133<H>  3.5   |  27  |  0.64    Ca    9.4      26 Apr 2019 17:27  Phos  2.9     04-26  Mg     2.0     04-26    TPro  4.6<L>  /  Alb  3.0<L>  /  TBili  0.5  /  DBili  x   /  AST  86<H>  /  ALT  80<H>  /  AlkPhos  56  04-25        Medications:     ALBUTerol/ipratropium for Nebulization. 3 milliLiter(s) Nebulizer every 6 hours PRN  atorvastatin 20 milliGRAM(s) Oral at bedtime  dextrose 5% + sodium chloride 0.9% with potassium chloride 20 mEq/L 1000 milliLiter(s) IV Continuous <Continuous>  famotidine    Tablet 20 milliGRAM(s) Oral daily  heparin  Injectable 5000 Unit(s) SubCutaneous every 12 hours  hydrochlorothiazide 25 milliGRAM(s) Oral daily  latanoprost 0.005% Ophthalmic Solution 1 Drop(s) Both EYES at bedtime  losartan 50 milliGRAM(s) Oral daily  ondansetron Injectable 4 milliGRAM(s) IV Push every 6 hours PRN  ondansetron Injectable 4 milliGRAM(s) IV Push once PRN  oxyCODONE    IR 5 milliGRAM(s) Oral every 3 hours PRN  pantoprazole    Tablet 40 milliGRAM(s) Oral before breakfast      Radiographs: No new imaging

## 2019-04-27 NOTE — PROGRESS NOTE ADULT - SUBJECTIVE AND OBJECTIVE BOX
Subjective: Patient is a 89y old  Female  with hypertension Copd hyperlipidemia who presents with a chief complaint weight loss anemia i performed a colonoscopy  found iron deficency anemia obstructing tumor hepatic flexure day 4 post op with microablation of Liver   long discussion with patient wants to go home with therapy at home PAST MEDICAL & SURGICAL HISTORY:  Anemia  Hearing deficit, bilateral  Hepatic lesion  Disease of intestine  HTN (hypertension)  Hyperlipidemia  Glaucoma  COPD (chronic obstructive pulmonary disease)  H/O blepharoplasty: BILATERAL  S/P eye surgery: s/p corneal transplant, right  Bilateral cataracts      Allergies    No Known Allergies    Intolerances        MEDICATIONS  (STANDING):  lactated ringers. 1000 milliLiter(s) (30 mL/Hr) IV Continuous <Continuous>  sodium chloride 0.9% lock flush 3 milliLiter(s) IV Push every 8 hours    MEDICATIONS  (PRN):      CONSTITUTIONAL: No fever,20lb some  fatigue  EYES: No eye pain, visual disturbances, or discharge  ENMT:  No difficulty hearing, tinnitus, vertigo; No sinus or throat pain  NECK: No pain or stiffness  BREASTS: No pain, masses, or nipple discharge  RESPIRATORY: No cough, wheezing, chills or hemoptysis; No shortness of breath  CARDIOVASCULAR: No chest pain, palpitations, dizziness, or leg swelling  GASTROINTESTINAL: No abdominal or epigastric pain. No nausea, vomiting, or hematemesis; No diarrhea or constipation. No melena or hematochezia.  GENITOURINARY: No dysuria, frequency, hematuria, or incontinence  NEUROLOGICAL: No headaches, memory loss, loss of strength, numbness, or tremors  SKIN: No itching, burning, rashes, or lesions   LYMPH NODES: No enlarged glands  ENDOCRINE: No heat or cold intolerance; No hair loss  MUSCULOSKELETAL: No joint pain or swelling; No muscle, back, or extremity pain  PSYCHIATRIC: No depression, anxiety, mood swings, or difficulty sleeping  HEME/LYMPH: No easy bruising, or bleeding gums  ALLERY AND IMMUNOLOGIC: No hives or eczema      PHYSICAL EXAM:    GENERAL: Comfortable, no acute distress pale HEAD:  Normocephalic, atraumatic afebrile vs stable   EYES: EOMI, PERRLA  HEENT: Moist mucous membranes  NECK: Supple, No JVD  NERVOUS SYSTEM:  Alert & Oriented X3, Motor Strength 5/5 B/L upper and lower extremities  CHEST/LUNG: Clear to auscultation bilaterally  HEART: Regular rate and rhythm  ABDOMEN: Soft, Nontender,distended,  wound dressed  GENITOURINARY: Voiding, no palpable bladder  EXTREMITIES:   No clubbing, cyanosis, or edema  MUSCULOSKELTAL- No muscle tenderness, no joint tenderness  SKIN-no rash  Basic Metabolic Panel w/Mg &amp; Inorg Phos (04.26.19 @ 17:27)    Calcium, Total Serum: 9.4 mg/dL    Phosphorus Level, Serum: 2.9: Delta: 2.2 on 04/26/  Delta: 2.2 on 04/26/ mg/dL    eGFR if : 90 mL/min    eGFR if Non : 78: The units for eGFR are ml/min/1.73m2 (normalized body  surface area). The eGFR is calculated from a serum  creatinine using the CKD-EPI equation. Other variables  required for calculation are race, age and sex. Among  patients with chronic kidney disease (CKD), the eGFR is  useful in determining the stage of disease according to  KDOQI CKD classification. All eGFR results are reported  numerically with the following interpretation.    GFR  (ml/min/1.73 m2)          W/KIDNEY DAMAGE    W/O KIDNEY DMG  ==========================================================  >= 90.......................Stage 1..............Normal  60-89.......................Stage 2...........Decreased GFR  30-59.......................Stage 3..............Stage 3  15-29.......................Stage 4..............Stage 4  < 15........................Stage 5..............Stage 5    Each stage of CKD assumes that the associated GFR level  has been in effect for at least 3 months. Determination of  stages one and two (with eGFR > 59ml/min/m2) requires  estimation of kidney damage for at least 3 months as  defined by structural or functional abnormalities.    Limitations: All estimates of GFR will be less accurate  for patients at extremes of muscle mass (including but  not limited to frail elderly, critically ill, or cancer  patients), those with unusual diets, and those with  conditions associated with reduced secretion or  extrarenal elimination of creatinine. The eGFR equation  is not recommended for use in patients with unstable  creatinine levels. mL/min    Sodium, Serum: 136 mmol/L    Potassium, Serum: 3.5 mmol/L    Chloride, Serum: 97 mmol/L    Carbon Dioxide, Serum: 27 mmol/L    Anion Gap, Serum: 12 mmo/L    Blood Urea Nitrogen, Serum: 7 mg/dL    Creatinine, Serum: 0.64 mg/dL    Glucose, Serum: 133 mg/dL    Magnesium, Serum: 2.0 mg/dL    Complete Blood Count (04.26.19 @ 17:27)    WBC Count: 14.14 K/uL    RBC Count: 4.95 M/uL    Hemoglobin: 9.7 g/dL    Hematocrit: 34.1 %    Mean Cell Volume: 68.9 fL    Mean Cell Hemoglobin: 19.6 pg    Mean Cell Hemoglobin Conc: 28.4 %    Red Cell Distrib Width: 22.7 %    Platelet Count - Automated: 223 K/uL    MPV: 10.1 fl    Nucleated RBC #: 0 K/uL        oImpression / Day5 status post hemicolectomy for ca colon   with microablation of liver lesion   pain control dvt prophylaxis khan dcd oob with assistance advance diet as tolerated    to monitor anemia advance diet as tolerated discussed with patient she wants to go home with therapy at home

## 2019-04-28 LAB
ANION GAP SERPL CALC-SCNC: 13 MMO/L — SIGNIFICANT CHANGE UP (ref 7–14)
ANION GAP SERPL CALC-SCNC: 15 MMO/L — HIGH (ref 7–14)
BUN SERPL-MCNC: 6 MG/DL — LOW (ref 7–23)
BUN SERPL-MCNC: 8 MG/DL — SIGNIFICANT CHANGE UP (ref 7–23)
CALCIUM SERPL-MCNC: 9.1 MG/DL — SIGNIFICANT CHANGE UP (ref 8.4–10.5)
CALCIUM SERPL-MCNC: 9.5 MG/DL — SIGNIFICANT CHANGE UP (ref 8.4–10.5)
CHLORIDE SERPL-SCNC: 92 MMOL/L — LOW (ref 98–107)
CHLORIDE SERPL-SCNC: 95 MMOL/L — LOW (ref 98–107)
CO2 SERPL-SCNC: 27 MMOL/L — SIGNIFICANT CHANGE UP (ref 22–31)
CO2 SERPL-SCNC: 28 MMOL/L — SIGNIFICANT CHANGE UP (ref 22–31)
CREAT SERPL-MCNC: 0.62 MG/DL — SIGNIFICANT CHANGE UP (ref 0.5–1.3)
CREAT SERPL-MCNC: 0.68 MG/DL — SIGNIFICANT CHANGE UP (ref 0.5–1.3)
GLUCOSE SERPL-MCNC: 127 MG/DL — HIGH (ref 70–99)
GLUCOSE SERPL-MCNC: 127 MG/DL — HIGH (ref 70–99)
HCT VFR BLD CALC: 32.8 % — LOW (ref 34.5–45)
HGB BLD-MCNC: 9.5 G/DL — LOW (ref 11.5–15.5)
MAGNESIUM SERPL-MCNC: 1.4 MG/DL — LOW (ref 1.6–2.6)
MAGNESIUM SERPL-MCNC: 1.7 MG/DL — SIGNIFICANT CHANGE UP (ref 1.6–2.6)
MCHC RBC-ENTMCNC: 19.3 PG — LOW (ref 27–34)
MCHC RBC-ENTMCNC: 29 % — LOW (ref 32–36)
MCV RBC AUTO: 66.8 FL — LOW (ref 80–100)
NRBC # FLD: 0 K/UL — SIGNIFICANT CHANGE UP (ref 0–0)
PHOSPHATE SERPL-MCNC: 2.5 MG/DL — SIGNIFICANT CHANGE UP (ref 2.5–4.5)
PHOSPHATE SERPL-MCNC: 4 MG/DL — SIGNIFICANT CHANGE UP (ref 2.5–4.5)
PLATELET # BLD AUTO: 226 K/UL — SIGNIFICANT CHANGE UP (ref 150–400)
PMV BLD: SIGNIFICANT CHANGE UP FL (ref 7–13)
POTASSIUM SERPL-MCNC: 2.7 MMOL/L — CRITICAL LOW (ref 3.5–5.3)
POTASSIUM SERPL-MCNC: 3 MMOL/L — LOW (ref 3.5–5.3)
POTASSIUM SERPL-SCNC: 2.7 MMOL/L — CRITICAL LOW (ref 3.5–5.3)
POTASSIUM SERPL-SCNC: 3 MMOL/L — LOW (ref 3.5–5.3)
RBC # BLD: 4.91 M/UL — SIGNIFICANT CHANGE UP (ref 3.8–5.2)
RBC # FLD: 22.4 % — HIGH (ref 10.3–14.5)
SODIUM SERPL-SCNC: 134 MMOL/L — LOW (ref 135–145)
SODIUM SERPL-SCNC: 136 MMOL/L — SIGNIFICANT CHANGE UP (ref 135–145)
WBC # BLD: 11.08 K/UL — HIGH (ref 3.8–10.5)
WBC # FLD AUTO: 11.08 K/UL — HIGH (ref 3.8–10.5)

## 2019-04-28 RX ORDER — POTASSIUM CHLORIDE 20 MEQ
40 PACKET (EA) ORAL ONCE
Qty: 0 | Refills: 0 | Status: COMPLETED | OUTPATIENT
Start: 2019-04-28 | End: 2019-04-28

## 2019-04-28 RX ORDER — POTASSIUM PHOSPHATE, MONOBASIC POTASSIUM PHOSPHATE, DIBASIC 236; 224 MG/ML; MG/ML
15 INJECTION, SOLUTION INTRAVENOUS ONCE
Qty: 0 | Refills: 0 | Status: COMPLETED | OUTPATIENT
Start: 2019-04-28 | End: 2019-04-28

## 2019-04-28 RX ORDER — MAGNESIUM SULFATE 500 MG/ML
1 VIAL (ML) INJECTION ONCE
Qty: 0 | Refills: 0 | Status: COMPLETED | OUTPATIENT
Start: 2019-04-28 | End: 2019-04-28

## 2019-04-28 RX ADMIN — FAMOTIDINE 20 MILLIGRAM(S): 10 INJECTION INTRAVENOUS at 11:21

## 2019-04-28 RX ADMIN — POTASSIUM PHOSPHATE, MONOBASIC POTASSIUM PHOSPHATE, DIBASIC 62.5 MILLIMOLE(S): 236; 224 INJECTION, SOLUTION INTRAVENOUS at 09:40

## 2019-04-28 RX ADMIN — ENOXAPARIN SODIUM 40 MILLIGRAM(S): 100 INJECTION SUBCUTANEOUS at 11:21

## 2019-04-28 RX ADMIN — LATANOPROST 1 DROP(S): 0.05 SOLUTION/ DROPS OPHTHALMIC; TOPICAL at 21:08

## 2019-04-28 RX ADMIN — Medication 100 GRAM(S): at 07:37

## 2019-04-28 RX ADMIN — ATORVASTATIN CALCIUM 20 MILLIGRAM(S): 80 TABLET, FILM COATED ORAL at 21:08

## 2019-04-28 RX ADMIN — Medication 40 MILLIEQUIVALENT(S): at 16:17

## 2019-04-28 RX ADMIN — LOSARTAN POTASSIUM 50 MILLIGRAM(S): 100 TABLET, FILM COATED ORAL at 04:51

## 2019-04-28 RX ADMIN — PANTOPRAZOLE SODIUM 40 MILLIGRAM(S): 20 TABLET, DELAYED RELEASE ORAL at 04:51

## 2019-04-28 RX ADMIN — Medication 25 MILLIGRAM(S): at 04:51

## 2019-04-28 RX ADMIN — Medication 40 MILLIEQUIVALENT(S): at 07:37

## 2019-04-28 NOTE — PROGRESS NOTE ADULT - ASSESSMENT
89F with colon cancer s/p extended right colectomy with primary stapled anastomosis and microwave ablation of liver lesion (4/23)    - pain control  - c/w CLD  - monitor GI fxn  - f/u UOP  - OOB and ambulating as tolerating  - PT-->home with home PT and rolling walker    D Team  [id number]

## 2019-04-28 NOTE — PROGRESS NOTE ADULT - SUBJECTIVE AND OBJECTIVE BOX
Surgery Progress Note      Subjective: Patient seen and examined. No acute events overnight. complaining of heartburn    T(C): 36.7 (04-27-19 @ 00:46), Max: 36.7 (04-26-19 @ 05:37)  HR: 91 (04-27-19 @ 00:46) (79 - 92)  BP: 154/73 (04-27-19 @ 00:12) (142/56 - 160/78)  RR: 18 (04-27-19 @ 00:46) (18 - 19)  SpO2: 98% (04-27-19 @ 00:46) (87% - 98%)      04-25-19 @ 07:01  -  04-26-19 @ 07:00  --------------------------------------------------------  IN: 0 mL / OUT: 2150 mL / NET: -2150 mL    04-26-19 @ 07:01  -  04-27-19 @ 01:02  --------------------------------------------------------  IN: 0 mL / OUT: 1425 mL / NET: -1425 mL        Physical Exam:     General: NAD  Abdomen: soft, appropriately tender, midline incision with louis    Labs:                          9.7    14.14 )-----------( 223      ( 26 Apr 2019 17:27 )             34.1     04-26    136  |  97<L>  |  7   ----------------------------<  133<H>  3.5   |  27  |  0.64    Ca    9.4      26 Apr 2019 17:27  Phos  2.9     04-26  Mg     2.0     04-26    TPro  4.6<L>  /  Alb  3.0<L>  /  TBili  0.5  /  DBili  x   /  AST  86<H>  /  ALT  80<H>  /  AlkPhos  56  04-25        Medications:     ALBUTerol/ipratropium for Nebulization. 3 milliLiter(s) Nebulizer every 6 hours PRN  atorvastatin 20 milliGRAM(s) Oral at bedtime  dextrose 5% + sodium chloride 0.9% with potassium chloride 20 mEq/L 1000 milliLiter(s) IV Continuous <Continuous>  famotidine    Tablet 20 milliGRAM(s) Oral daily  heparin  Injectable 5000 Unit(s) SubCutaneous every 12 hours  hydrochlorothiazide 25 milliGRAM(s) Oral daily  latanoprost 0.005% Ophthalmic Solution 1 Drop(s) Both EYES at bedtime  losartan 50 milliGRAM(s) Oral daily  ondansetron Injectable 4 milliGRAM(s) IV Push every 6 hours PRN  ondansetron Injectable 4 milliGRAM(s) IV Push once PRN  oxyCODONE    IR 5 milliGRAM(s) Oral every 3 hours PRN  pantoprazole    Tablet 40 milliGRAM(s) Oral before breakfast      Radiographs: No new imaging Surgery Progress Note      Subjective: Patient seen and examined. No acute events overnight. complaining of heartburn that is much improved. louis removed this AM on rounds.     T(C): 36.7 (04-27-19 @ 00:46), Max: 36.7 (04-26-19 @ 05:37)  HR: 91 (04-27-19 @ 00:46) (79 - 92)  BP: 154/73 (04-27-19 @ 00:12) (142/56 - 160/78)  RR: 18 (04-27-19 @ 00:46) (18 - 19)  SpO2: 98% (04-27-19 @ 00:46) (87% - 98%)      04-25-19 @ 07:01  -  04-26-19 @ 07:00  --------------------------------------------------------  IN: 0 mL / OUT: 2150 mL / NET: -2150 mL    04-26-19 @ 07:01  -  04-27-19 @ 01:02  --------------------------------------------------------  IN: 0 mL / OUT: 1425 mL / NET: -1425 mL        Physical Exam:     General: NAD  Abdomen: soft, appropriately tender, midline incision c/d/i, no rebound or guarding    Labs:                          9.7    14.14 )-----------( 223      ( 26 Apr 2019 17:27 )             34.1     04-26    136  |  97<L>  |  7   ----------------------------<  133<H>  3.5   |  27  |  0.64    Ca    9.4      26 Apr 2019 17:27  Phos  2.9     04-26  Mg     2.0     04-26    TPro  4.6<L>  /  Alb  3.0<L>  /  TBili  0.5  /  DBili  x   /  AST  86<H>  /  ALT  80<H>  /  AlkPhos  56  04-25        Medications:     ALBUTerol/ipratropium for Nebulization. 3 milliLiter(s) Nebulizer every 6 hours PRN  atorvastatin 20 milliGRAM(s) Oral at bedtime  dextrose 5% + sodium chloride 0.9% with potassium chloride 20 mEq/L 1000 milliLiter(s) IV Continuous <Continuous>  famotidine    Tablet 20 milliGRAM(s) Oral daily  heparin  Injectable 5000 Unit(s) SubCutaneous every 12 hours  hydrochlorothiazide 25 milliGRAM(s) Oral daily  latanoprost 0.005% Ophthalmic Solution 1 Drop(s) Both EYES at bedtime  losartan 50 milliGRAM(s) Oral daily  ondansetron Injectable 4 milliGRAM(s) IV Push every 6 hours PRN  ondansetron Injectable 4 milliGRAM(s) IV Push once PRN  oxyCODONE    IR 5 milliGRAM(s) Oral every 3 hours PRN  pantoprazole    Tablet 40 milliGRAM(s) Oral before breakfast      Radiographs: No new imaging

## 2019-04-28 NOTE — PROGRESS NOTE ADULT - ASSESSMENT
89 y/o female with colon cancer s/p extended right colectomy with primary anastomosis and microwave ablation of liver lesion     - pain control  diet as per sx  leukocytosis better  monitor   oob  dvt proph  copd stable  htn stable  resumed meds   hyponatremia  repeat bmp improved  hypokalemia  supp k +  analgesics  pt  ppi

## 2019-04-28 NOTE — PROGRESS NOTE ADULT - SUBJECTIVE AND OBJECTIVE BOX
CHIEF COMPLAINT:Patient is a 89y old  Female who presents with a chief complaint of colon cancer Copd hypertension (28 Apr 2019 06:59)    	        PAST MEDICAL & SURGICAL HISTORY:  Anemia  Hearing deficit, bilateral  Hepatic lesion  Disease of intestine  HTN (hypertension)  Hyperlipidemia  Glaucoma  COPD (chronic obstructive pulmonary disease)  H/O blepharoplasty: BILATERAL  S/P eye surgery: s/p corneal transplant, right  Bilateral cataracts          REVIEW OF SYSTEMS:  feeling better overall  no cp or sob  no chills  no fever    Medications:  MEDICATIONS  (STANDING):  atorvastatin 20 milliGRAM(s) Oral at bedtime  dextrose 5% + sodium chloride 0.9% with potassium chloride 20 mEq/L 1000 milliLiter(s) (50 mL/Hr) IV Continuous <Continuous>  enoxaparin Injectable 40 milliGRAM(s) SubCutaneous daily  famotidine    Tablet 20 milliGRAM(s) Oral daily  hydrochlorothiazide 25 milliGRAM(s) Oral daily  latanoprost 0.005% Ophthalmic Solution 1 Drop(s) Both EYES at bedtime  losartan 50 milliGRAM(s) Oral daily  pantoprazole    Tablet 40 milliGRAM(s) Oral before breakfast    MEDICATIONS  (PRN):  ALBUTerol/ipratropium for Nebulization. 3 milliLiter(s) Nebulizer every 6 hours PRN Shortness of Breath and/or Wheezing  ondansetron Injectable 4 milliGRAM(s) IV Push every 6 hours PRN Nausea  ondansetron Injectable 4 milliGRAM(s) IV Push once PRN Nausea and/or Vomiting  oxyCODONE    IR 5 milliGRAM(s) Oral every 3 hours PRN Moderate Pain (4 - 6)    	    PHYSICAL EXAM:  T(C): 36.6 (04-28-19 @ 11:19), Max: 36.9 (04-28-19 @ 07:36)  HR: 84 (04-28-19 @ 11:19) (84 - 92)  BP: 144/65 (04-28-19 @ 11:19) (144/65 - 173/77)  RR: 18 (04-28-19 @ 11:19) (18 - 18)  SpO2: 91% (04-28-19 @ 11:19) (91% - 97%)  Wt(kg): --  I&O's Summary    27 Apr 2019 07:01  -  28 Apr 2019 07:00  --------------------------------------------------------  IN: 1580 mL / OUT: 1450 mL / NET: 130 mL    28 Apr 2019 07:01  -  28 Apr 2019 15:31  --------------------------------------------------------  IN: 1150 mL / OUT: 650 mL / NET: 500 mL        Appearance: Normal	  HEENT:   Normal oral mucosa, PERRL, EOMI	  Lymphatic: No lymphadenopathy  Cardiovascular: Normal S1 S2, No JVD,   Respiratory: Lungs clear to auscultation	  Psychiatry: A & O   Gastrointestinal:  Soft, mildly tender no r/g  Skin: No rashes, No ecchymoses, No cyanosis	  Neurologic: Non-focal  Extremities: Normal range of motion, No clubbing, cyanosis   Vascular: Peripheral pulses palpable 2+ bilaterally    TELEMETRY: 	    ECG:  	  RADIOLOGY:  OTHER: 	  	  LABS:	 	    CARDIAC MARKERS:                                9.5    11.08 )-----------( 226      ( 28 Apr 2019 05:50 )             32.8     04-28    134<L>  |  92<L>  |  8   ----------------------------<  127<H>  3.0<L>   |  27  |  0.68    Ca    9.5      28 Apr 2019 14:35  Phos  4.0     04-28  Mg     1.7     04-28      proBNP:   Lipid Profile:   HgA1c:   TSH:

## 2019-04-28 NOTE — PROGRESS NOTE ADULT - SUBJECTIVE AND OBJECTIVE BOX
Subjective: Patient is a 89y old  Female  with hypertension Copd hyperlipidemia who presents with a chief complaint weight loss anemia i performed a colonoscopy  found iron deficency anemia obstructing tumor hepatic flexure day 5 post op with microablation of Liver   long discussion with patient wants to go home with therapy at home PAST MEDICAL & SURGICAL HISTORY:  Anemia  Hearing deficit, bilateral  Hepatic lesion  Disease of intestine  HTN (hypertension)  Hyperlipidemia  Glaucoma  COPD (chronic obstructive pulmonary disease)  H/O blepharoplasty: BILATERAL  S/P eye surgery: s/p corneal transplant, right  Bilateral cataracts      Allergies    No Known Allergies    Intolerances        MEDICATIONS  (STANDING):  lactated ringers. 1000 milliLiter(s) (30 mL/Hr) IV Continuous <Continuous>  sodium chloride 0.9% lock flush 3 milliLiter(s) IV Push every 8 hours    MEDICATIONS  (PRN):      CONSTITUTIONAL: No fever,20lb some  fatigue  EYES: No eye pain, visual disturbances, or discharge  ENMT:  No difficulty hearing, tinnitus, vertigo; No sinus or throat pain  NECK: No pain or stiffness  BREASTS: No pain, masses, or nipple discharge  RESPIRATORY: No cough, wheezing, chills or hemoptysis; No shortness of breath  CARDIOVASCULAR: No chest pain, palpitations, dizziness, or leg swelling  GASTROINTESTINAL: No abdominal or epigastric pain. No nausea, vomiting, or hematemesis; No diarrhea or constipation. No melena or hematochezia.  GENITOURINARY: No dysuria, frequency, hematuria, or incontinence  NEUROLOGICAL: No headaches, memory loss, loss of strength, numbness, or tremors  SKIN: No itching, burning, rashes, or lesions   LYMPH NODES: No enlarged glands  ENDOCRINE: No heat or cold intolerance; No hair loss  MUSCULOSKELETAL: No joint pain or swelling; No muscle, back, or extremity pain  PSYCHIATRIC: No depression, anxiety, mood swings, or difficulty sleeping  HEME/LYMPH: No easy bruising, or bleeding gums  ALLERY AND IMMUNOLOGIC: No hives or eczema      PHYSICAL EXAM:    GENERAL: Comfortable, no acute distress pale HEAD:  Normocephalic, atraumatic afebrile vs stable   EYES: EOMI, PERRLA  HEENT: Moist mucous membranes  NECK: Supple, No JVD  NERVOUS SYSTEM:  Alert & Oriented X3, Motor Strength 5/5 B/L upper and lower extremities  CHEST/LUNG: Clear to auscultation bilaterally  HEART: Regular rate and rhythm  ABDOMEN: Soft, Nontender,distended,  wound dressed  GENITOURINARY: Voiding, no palpable bladder  EXTREMITIES:   No clubbing, cyanosis, or edema  MUSCULOSKELTAL- No muscle tenderness, no joint tenderness  SKIN-no rash  Complete Blood Count in AM (04.28.19 @ 05:50)    WBC Count: 11.08 K/uL    RBC Count: 4.91 M/uL    Hemoglobin: 9.5 g/dL    Hematocrit: 32.8 %    Mean Cell Volume: 66.8 fL    Mean Cell Hemoglobin: 19.3 pg    Mean Cell Hemoglobin Conc: 29.0 %    Red Cell Distrib Width: 22.4 %    Platelet Count - Automated: 226 K/uL    MPV: Test not performed fl    Nucleated RBC #: 0 K/uL    Basic Metabolic Panel in AM (04.28.19 @ 05:50)    Sodium, Serum: 136 mmol/L    Potassium, Serum: 2.7: RESULTS REPEATED. mmol/L    Chloride, Serum: 95 mmol/L    Carbon Dioxide, Serum: 28 mmol/L    Anion Gap, Serum: 13 mmo/L    Blood Urea Nitrogen, Serum: 6 mg/dL    Creatinine, Serum: 0.62 mg/dL    Glucose, Serum: 127 mg/dL    Calcium, Total Serum: 9.1 mg/dL    eGFR if Non : 79: The units for eGFR are ml/min/1.73m2 (normalized body  surface area). The eGFR is calculated from a serum  creatinine using the CKD-EPI equation. Other variables  required for calculation are race, age and sex. Among  patients with chronic kidney disease (CKD), the eGFR is  useful in determining the stage of disease according to  KDOQI CKD classification. All eGFR results are reported  numerically with the following interpretation.    GFR  (ml/min/1.73 m2)          W/KIDNEY DAMAGE    W/O KIDNEY DMG  ==========================================================  >= 90.......................Stage 1..............Normal  60-89.......................Stage 2...........Decreased GFR  30-59.......................Stage 3..............Stage 3  15-29.......................Stage 4..............Stage 4  < 15........................Stage 5..............Stage 5    Each stage of CKD assumes that the associated GFR level  has been in effect for at least 3 months. Determination of  stages one and two (with eGFR > 59ml/min/m2) requires  estimation of kidney damage for at least 3 months as  defined by structural or functional abnormalities.    Limitations: All estimates of GFR will be less accurate  for patients at extremes of muscle mass (including but  not limited to frail elderly, critically ill, or cancer  patients), those with unusual diets, and those with  conditions associated with reduced secretion or  extrarenal elimination of creatinine. The eGFR equation  is not recommended for use in patients with unstable  creatinine levels. mL/min    eGFR if : 91 mL/min        Impression / Day5 status post hemicolectomy for ca colon   with microablation of liver lesion   pain control dvt prophylaxis bill xavier oob with assistance advance diet as tolerated    to monitor anemia advance diet as tolerated discussed with patient she wants to go home with therapy at home

## 2019-04-29 LAB
ANION GAP SERPL CALC-SCNC: 11 MMO/L — SIGNIFICANT CHANGE UP (ref 7–14)
ANION GAP SERPL CALC-SCNC: 13 MMO/L — SIGNIFICANT CHANGE UP (ref 7–14)
BUN SERPL-MCNC: 10 MG/DL — SIGNIFICANT CHANGE UP (ref 7–23)
BUN SERPL-MCNC: 8 MG/DL — SIGNIFICANT CHANGE UP (ref 7–23)
CALCIUM SERPL-MCNC: 9 MG/DL — SIGNIFICANT CHANGE UP (ref 8.4–10.5)
CALCIUM SERPL-MCNC: 9.1 MG/DL — SIGNIFICANT CHANGE UP (ref 8.4–10.5)
CHLORIDE SERPL-SCNC: 92 MMOL/L — LOW (ref 98–107)
CHLORIDE SERPL-SCNC: 94 MMOL/L — LOW (ref 98–107)
CO2 SERPL-SCNC: 26 MMOL/L — SIGNIFICANT CHANGE UP (ref 22–31)
CO2 SERPL-SCNC: 28 MMOL/L — SIGNIFICANT CHANGE UP (ref 22–31)
CREAT SERPL-MCNC: 0.62 MG/DL — SIGNIFICANT CHANGE UP (ref 0.5–1.3)
CREAT SERPL-MCNC: 0.68 MG/DL — SIGNIFICANT CHANGE UP (ref 0.5–1.3)
GLUCOSE SERPL-MCNC: 118 MG/DL — HIGH (ref 70–99)
GLUCOSE SERPL-MCNC: 120 MG/DL — HIGH (ref 70–99)
HCT VFR BLD CALC: 33.6 % — LOW (ref 34.5–45)
HGB BLD-MCNC: 9.8 G/DL — LOW (ref 11.5–15.5)
MAGNESIUM SERPL-MCNC: 1.5 MG/DL — LOW (ref 1.6–2.6)
MAGNESIUM SERPL-MCNC: 2.3 MG/DL — SIGNIFICANT CHANGE UP (ref 1.6–2.6)
MCHC RBC-ENTMCNC: 19.5 PG — LOW (ref 27–34)
MCHC RBC-ENTMCNC: 29.2 % — LOW (ref 32–36)
MCV RBC AUTO: 66.9 FL — LOW (ref 80–100)
NRBC # FLD: 0 K/UL — SIGNIFICANT CHANGE UP (ref 0–0)
PHOSPHATE SERPL-MCNC: 2.6 MG/DL — SIGNIFICANT CHANGE UP (ref 2.5–4.5)
PHOSPHATE SERPL-MCNC: 2.7 MG/DL — SIGNIFICANT CHANGE UP (ref 2.5–4.5)
PLATELET # BLD AUTO: 244 K/UL — SIGNIFICANT CHANGE UP (ref 150–400)
PMV BLD: SIGNIFICANT CHANGE UP FL (ref 7–13)
POTASSIUM SERPL-MCNC: 2.8 MMOL/L — CRITICAL LOW (ref 3.5–5.3)
POTASSIUM SERPL-MCNC: 3 MMOL/L — LOW (ref 3.5–5.3)
POTASSIUM SERPL-SCNC: 2.8 MMOL/L — CRITICAL LOW (ref 3.5–5.3)
POTASSIUM SERPL-SCNC: 3 MMOL/L — LOW (ref 3.5–5.3)
RBC # BLD: 5.02 M/UL — SIGNIFICANT CHANGE UP (ref 3.8–5.2)
RBC # FLD: 22.7 % — HIGH (ref 10.3–14.5)
SODIUM SERPL-SCNC: 131 MMOL/L — LOW (ref 135–145)
SODIUM SERPL-SCNC: 133 MMOL/L — LOW (ref 135–145)
WBC # BLD: 12.5 K/UL — HIGH (ref 3.8–10.5)
WBC # FLD AUTO: 12.5 K/UL — HIGH (ref 3.8–10.5)

## 2019-04-29 RX ORDER — POTASSIUM CHLORIDE 20 MEQ
40 PACKET (EA) ORAL ONCE
Qty: 0 | Refills: 0 | Status: COMPLETED | OUTPATIENT
Start: 2019-04-29 | End: 2019-04-29

## 2019-04-29 RX ORDER — POTASSIUM PHOSPHATE, MONOBASIC POTASSIUM PHOSPHATE, DIBASIC 236; 224 MG/ML; MG/ML
30 INJECTION, SOLUTION INTRAVENOUS ONCE
Qty: 0 | Refills: 0 | Status: DISCONTINUED | OUTPATIENT
Start: 2019-04-29 | End: 2019-04-29

## 2019-04-29 RX ORDER — MAGNESIUM SULFATE 500 MG/ML
2 VIAL (ML) INJECTION ONCE
Qty: 0 | Refills: 0 | Status: DISCONTINUED | OUTPATIENT
Start: 2019-04-29 | End: 2019-04-29

## 2019-04-29 RX ORDER — POTASSIUM CHLORIDE 20 MEQ
10 PACKET (EA) ORAL
Qty: 0 | Refills: 0 | Status: COMPLETED | OUTPATIENT
Start: 2019-04-29 | End: 2019-04-29

## 2019-04-29 RX ORDER — POTASSIUM CHLORIDE 20 MEQ
10 PACKET (EA) ORAL ONCE
Qty: 0 | Refills: 0 | Status: COMPLETED | OUTPATIENT
Start: 2019-04-29 | End: 2019-04-29

## 2019-04-29 RX ORDER — MAGNESIUM SULFATE 500 MG/ML
2 VIAL (ML) INJECTION ONCE
Qty: 0 | Refills: 0 | Status: COMPLETED | OUTPATIENT
Start: 2019-04-29 | End: 2019-04-29

## 2019-04-29 RX ADMIN — Medication 40 MILLIEQUIVALENT(S): at 21:36

## 2019-04-29 RX ADMIN — Medication 100 MILLIEQUIVALENT(S): at 07:58

## 2019-04-29 RX ADMIN — LATANOPROST 1 DROP(S): 0.05 SOLUTION/ DROPS OPHTHALMIC; TOPICAL at 21:36

## 2019-04-29 RX ADMIN — Medication 25 MILLIGRAM(S): at 05:06

## 2019-04-29 RX ADMIN — Medication 50 GRAM(S): at 12:18

## 2019-04-29 RX ADMIN — PANTOPRAZOLE SODIUM 40 MILLIGRAM(S): 20 TABLET, DELAYED RELEASE ORAL at 05:06

## 2019-04-29 RX ADMIN — Medication 100 MILLIEQUIVALENT(S): at 10:46

## 2019-04-29 RX ADMIN — ATORVASTATIN CALCIUM 20 MILLIGRAM(S): 80 TABLET, FILM COATED ORAL at 21:36

## 2019-04-29 RX ADMIN — ENOXAPARIN SODIUM 40 MILLIGRAM(S): 100 INJECTION SUBCUTANEOUS at 12:18

## 2019-04-29 RX ADMIN — FAMOTIDINE 20 MILLIGRAM(S): 10 INJECTION INTRAVENOUS at 12:18

## 2019-04-29 RX ADMIN — Medication 100 MILLIEQUIVALENT(S): at 09:22

## 2019-04-29 RX ADMIN — LOSARTAN POTASSIUM 50 MILLIGRAM(S): 100 TABLET, FILM COATED ORAL at 05:06

## 2019-04-29 NOTE — PROGRESS NOTE ADULT - ASSESSMENT
89 y/o female with colon cancer s/p extended right colectomy with primary anastomosis and microwave ablation of liver lesion     - pain control  diet as per sx  leukocytosis better  monitor   oob  dvt proph  copd stable  htn stable  resumed meds   hyponatremia improved  repeat bmp improved  hypokalemia  supp k +  hypomag  supp mg prn  analgesics  pt  ppi

## 2019-04-29 NOTE — PROGRESS NOTE ADULT - SUBJECTIVE AND OBJECTIVE BOX
Surgery Progress Note    S: Patient seen and examined. No acute events overnight. patient tolerating CLD without n/v. - flatus, - BM. pain is well controlled.     O:  Vital Signs Last 24 Hrs  T(C): 36.3 (28 Apr 2019 19:22), Max: 36.9 (28 Apr 2019 07:36)  T(F): 97.3 (28 Apr 2019 19:22), Max: 98.4 (28 Apr 2019 07:36)  HR: 92 (28 Apr 2019 19:22) (84 - 92)  BP: 175/69 (28 Apr 2019 19:22) (144/65 - 175/69)  BP(mean): 82 (28 Apr 2019 11:19) (82 - 90)  RR: 17 (28 Apr 2019 19:22) (17 - 18)  SpO2: 95% (28 Apr 2019 19:22) (91% - 97%)    I&O's Detail    27 Apr 2019 07:01  -  28 Apr 2019 07:00  --------------------------------------------------------  IN:    dextrose 5% + sodium chloride 0.9% with potassium chloride 20 mEq/L: 1100 mL    Oral Fluid: 480 mL  Total IN: 1580 mL    OUT:    Voided: 1450 mL  Total OUT: 1450 mL    Total NET: 130 mL      28 Apr 2019 07:01  -  29 Apr 2019 00:16  --------------------------------------------------------  IN:    dextrose 5% + sodium chloride 0.9% with potassium chloride 20 mEq/L: 700 mL    IV PiggyBack: 300 mL    Oral Fluid: 840 mL  Total IN: 1840 mL    OUT:    Voided: 850 mL  Total OUT: 850 mL    Total NET: 990 mL          MEDICATIONS  (STANDING):  atorvastatin 20 milliGRAM(s) Oral at bedtime  dextrose 5% + sodium chloride 0.9% with potassium chloride 20 mEq/L 1000 milliLiter(s) (50 mL/Hr) IV Continuous <Continuous>  enoxaparin Injectable 40 milliGRAM(s) SubCutaneous daily  famotidine    Tablet 20 milliGRAM(s) Oral daily  hydrochlorothiazide 25 milliGRAM(s) Oral daily  latanoprost 0.005% Ophthalmic Solution 1 Drop(s) Both EYES at bedtime  losartan 50 milliGRAM(s) Oral daily  pantoprazole    Tablet 40 milliGRAM(s) Oral before breakfast    MEDICATIONS  (PRN):  ALBUTerol/ipratropium for Nebulization. 3 milliLiter(s) Nebulizer every 6 hours PRN Shortness of Breath and/or Wheezing  ondansetron Injectable 4 milliGRAM(s) IV Push every 6 hours PRN Nausea  ondansetron Injectable 4 milliGRAM(s) IV Push once PRN Nausea and/or Vomiting  oxyCODONE    IR 5 milliGRAM(s) Oral every 3 hours PRN Moderate Pain (4 - 6)                            9.5    11.08 )-----------( 226      ( 28 Apr 2019 05:50 )             32.8       04-28    134<L>  |  92<L>  |  8   ----------------------------<  127<H>  3.0<L>   |  27  |  0.68    Ca    9.5      28 Apr 2019 14:35  Phos  4.0     04-28  Mg     1.7     04-28        Physical Exam:  Gen: Laying in bed, NAD  Resp: Unlabored breathing  Abd: soft, NT, mildly distended, midline incision c/d/i, no rebound or guarding  Ext: [facility]\ Surgery Progress Note    S: Patient seen and examined. No acute events overnight. patient tolerating CLD without n/v. - flatus, + small BM. pain is well controlled.     O:  Vital Signs Last 24 Hrs  T(C): 36.3 (28 Apr 2019 19:22), Max: 36.9 (28 Apr 2019 07:36)  T(F): 97.3 (28 Apr 2019 19:22), Max: 98.4 (28 Apr 2019 07:36)  HR: 92 (28 Apr 2019 19:22) (84 - 92)  BP: 175/69 (28 Apr 2019 19:22) (144/65 - 175/69)  BP(mean): 82 (28 Apr 2019 11:19) (82 - 90)  RR: 17 (28 Apr 2019 19:22) (17 - 18)  SpO2: 95% (28 Apr 2019 19:22) (91% - 97%)    I&O's Detail    27 Apr 2019 07:01  -  28 Apr 2019 07:00  --------------------------------------------------------  IN:    dextrose 5% + sodium chloride 0.9% with potassium chloride 20 mEq/L: 1100 mL    Oral Fluid: 480 mL  Total IN: 1580 mL    OUT:    Voided: 1450 mL  Total OUT: 1450 mL    Total NET: 130 mL      28 Apr 2019 07:01  -  29 Apr 2019 00:16  --------------------------------------------------------  IN:    dextrose 5% + sodium chloride 0.9% with potassium chloride 20 mEq/L: 700 mL    IV PiggyBack: 300 mL    Oral Fluid: 840 mL  Total IN: 1840 mL    OUT:    Voided: 850 mL  Total OUT: 850 mL    Total NET: 990 mL          MEDICATIONS  (STANDING):  atorvastatin 20 milliGRAM(s) Oral at bedtime  dextrose 5% + sodium chloride 0.9% with potassium chloride 20 mEq/L 1000 milliLiter(s) (50 mL/Hr) IV Continuous <Continuous>  enoxaparin Injectable 40 milliGRAM(s) SubCutaneous daily  famotidine    Tablet 20 milliGRAM(s) Oral daily  hydrochlorothiazide 25 milliGRAM(s) Oral daily  latanoprost 0.005% Ophthalmic Solution 1 Drop(s) Both EYES at bedtime  losartan 50 milliGRAM(s) Oral daily  pantoprazole    Tablet 40 milliGRAM(s) Oral before breakfast    MEDICATIONS  (PRN):  ALBUTerol/ipratropium for Nebulization. 3 milliLiter(s) Nebulizer every 6 hours PRN Shortness of Breath and/or Wheezing  ondansetron Injectable 4 milliGRAM(s) IV Push every 6 hours PRN Nausea  ondansetron Injectable 4 milliGRAM(s) IV Push once PRN Nausea and/or Vomiting  oxyCODONE    IR 5 milliGRAM(s) Oral every 3 hours PRN Moderate Pain (4 - 6)                            9.5    11.08 )-----------( 226      ( 28 Apr 2019 05:50 )             32.8       04-28    134<L>  |  92<L>  |  8   ----------------------------<  127<H>  3.0<L>   |  27  |  0.68    Ca    9.5      28 Apr 2019 14:35  Phos  4.0     04-28  Mg     1.7     04-28        Physical Exam:  Gen: Laying in bed, NAD  Resp: Unlabored breathing  Abd: soft, NT, mildly distended, midline incision c/d/i, no rebound or guarding  Ext: WWP\

## 2019-04-29 NOTE — PROGRESS NOTE ADULT - ASSESSMENT
89F with colon cancer s/p extended right colectomy with primary stapled anastomosis and microwave ablation of liver lesion (4/23)    - pain control as needed  - c/w CLD  - monitor GI fxn  - lovenox for DVT ppx  - OOB and ambulating as tolerating  - PT-->home with home PT and rolling walker    D Team  [id number] 89F with colon cancer s/p extended right colectomy with primary stapled anastomosis and microwave ablation of liver lesion (4/23)    - pain control as needed  - c/w CLD  - monitor GI fxn  - lovenox for DVT ppx  -- education  - OOB and ambulating as tolerating  - PT-->home with home PT and rolling walker    D Team  [id number]

## 2019-04-29 NOTE — PROGRESS NOTE ADULT - SUBJECTIVE AND OBJECTIVE BOX
Subjective: Patient is a 89y old  Female  with hypertension Copd hyperlipidemia who presents with a chief complaint weight loss anemia i performed a colonoscopy  found iron deficency anemia obstructing tumor hepatic flexure day 6 post op with microablation of Liver   long discussion with patient wants to go home with therapy at home PAST MEDICAL & SURGICAL HISTORY:  Anemia  Hearing deficit, bilateral  Hepatic lesion  Disease of intestine  HTN (hypertension)  Hyperlipidemia  Glaucoma  COPD (chronic obstructive pulmonary disease)  H/O blepharoplasty: BILATERAL  S/P eye surgery: s/p corneal transplant, right  Bilateral cataracts      Allergies    No Known Allergies    Intolerances        MEDICATIONS  (STANDING):  lactated ringers. 1000 milliLiter(s) (30 mL/Hr) IV Continuous <Continuous>  sodium chloride 0.9% lock flush 3 milliLiter(s) IV Push every 8 hours    MEDICATIONS  (PRN):      CONSTITUTIONAL: No fever,20lb some  fatigue  EYES: No eye pain, visual disturbances, or discharge  ENMT:  No difficulty hearing, tinnitus, vertigo; No sinus or throat pain  NECK: No pain or stiffness  BREASTS: No pain, masses, or nipple discharge  RESPIRATORY: No cough, wheezing, chills or hemoptysis; No shortness of breath  CARDIOVASCULAR: No chest pain, palpitations, dizziness, or leg swelling  GASTROINTESTINAL: No abdominal or epigastric pain. No nausea, vomiting, or hematemesis; No diarrhea or constipation. No melena or hematochezia.  GENITOURINARY: No dysuria, frequency, hematuria, or incontinence  NEUROLOGICAL: No headaches, memory loss, loss of strength, numbness, or tremors  SKIN: No itching, burning, rashes, or lesions   LYMPH NODES: No enlarged glands  ENDOCRINE: No heat or cold intolerance; No hair loss  MUSCULOSKELETAL: No joint pain or swelling; No muscle, back, or extremity pain  PSYCHIATRIC: No depression, anxiety, mood swings, or difficulty sleeping  HEME/LYMPH: No easy bruising, or bleeding gums  ALLERY AND IMMUNOLOGIC: No hives or eczema      PHYSICAL EXAM:    GENERAL: Comfortable, no acute distress pale HEAD:  Normocephalic, atraumatic afebrile vs stable   EYES: EOMI, PERRLA  HEENT: Moist mucous membranes  NECK: Supple, No JVD  NERVOUS SYSTEM:  Alert & Oriented X3, Motor Strength 5/5 B/L upper and lower extremities  CHEST/LUNG: Clear to auscultation bilaterally  HEART: Regular rate and rhythm  ABDOMEN: Soft, Nontender,distended,  wound dressed  GENITOURINARY: Voiding, no palpable bladder  EXTREMITIES:   No clubbing, cyanosis, or edema  MUSCULOSKELTAL- No muscle tenderness, no joint tenderness  SKIN-no rash  Basic Metabolic Panel w/Mg &amp; Inorg Phos (04.28.19 @ 14:35)    Calcium, Total Serum: 9.5 mg/dL    Phosphorus Level, Serum: 4.0: Delta: 2.5 on 04/28/  Delta: 2.5 on 04/28/ mg/dL    eGFR if : 89 mL/min    eGFR if Non : 76: The units for eGFR are ml/min/1.73m2 (normalized body  surface area). The eGFR is calculated from a serum  creatinine using the CKD-EPI equation. Other variables  required for calculation are race, age and sex. Among  patients with chronic kidney disease (CKD), the eGFR is  useful in determining the stage of disease according to  KDOQI CKD classification. All eGFR results are reported  numerically with the following interpretation.    GFR  (ml/min/1.73 m2)          W/KIDNEY DAMAGE    W/O KIDNEY DMG  ==========================================================  >= 90.......................Stage 1..............Normal  60-89.......................Stage 2...........Decreased GFR  30-59.......................Stage 3..............Stage 3  15-29.......................Stage 4..............Stage 4  < 15........................Stage 5..............Stage 5    Each stage of CKD assumes that the associated GFR level  has been in effect for at least 3 months. Determination of  stages one and two (with eGFR > 59ml/min/m2) requires  estimation of kidney damage for at least 3 months as  defined by structural or functional abnormalities.    Limitations: All estimates of GFR will be less accurate  for patients at extremes of muscle mass (including but  not limited to frail elderly, critically ill, or cancer  patients), those with unusual diets, and those with  conditions associated with reduced secretion or  extrarenal elimination of creatinine. The eGFR equation  is not recommended for use in patients with unstable  creatinine levels. mL/min    Sodium, Serum: 134 mmol/L    Potassium, Serum: 3.0 mmol/L    Chloride, Serum: 92 mmol/L    Carbon Dioxide, Serum: 27 mmol/L    Anion Gap, Serum: 15 mmo/L    Blood Urea Nitrogen, Serum: 8 mg/dL    Creatinine, Serum: 0.68 mg/dL    Glucose, Serum: 127 mg/dL    Magnesium, Serum: 1.7 mg/dL  Complete Blood Count in AM (04.28.19 @ 05:50)    WBC Count: 11.08 K/uL    RBC Count: 4.91 M/uL    Hemoglobin: 9.5 g/dL    Hematocrit: 32.8 %    Mean Cell Volume: 66.8 fL    Mean Cell Hemoglobin: 19.3 pg    Mean Cell Hemoglobin Conc: 29.0 %    Red Cell Distrib Width: 22.4 %    Platelet Count - Automated: 226 K/uL    MPV: Test not performed fl    Nucleated RBC #: 0 K/uL            Impression / Day6 status post hemicolectomy for ca colon   with microablation of liver lesion   pain control dvt prophylaxis khan dcd oob with assistance advance diet as tolerated    to monitor anemia advance diet as tolerated discussed with patient she wants to go home with therapy at home

## 2019-04-29 NOTE — PROGRESS NOTE ADULT - SUBJECTIVE AND OBJECTIVE BOX
CHIEF COMPLAINT:Patient is a 89y old  Female who presents with a chief complaint of colon ca copd high bp (29 Apr 2019 04:24)    	        PAST MEDICAL & SURGICAL HISTORY:  Anemia  Hearing deficit, bilateral  Hepatic lesion  Disease of intestine  HTN (hypertension)  Hyperlipidemia  Glaucoma  COPD (chronic obstructive pulmonary disease)  H/O blepharoplasty: BILATERAL  S/P eye surgery: s/p corneal transplant, right  Bilateral cataracts          REVIEW OF SYSTEMS:  CONSTITUTIONAL: No fever, weight loss, or fatigue  EYES: No eye pain, visual disturbances, or discharge  NECK: No pain or stiffness  RESPIRATORY: No cough, wheezing, chills or hemoptysis; No Shortness of Breath  CARDIOVASCULAR: No chest pain, palpitations, passing out, dizziness, or leg swelling  GASTROINTESTINAL: No abdominal or epigastric pain. No nausea, vomiting, or hematemesis; No diarrhea or constipation. No melena or hematochezia.  GENITOURINARY: No dysuria, frequency, hematuria, or incontinence  NEUROLOGICAL: No headaches, memory loss, loss of strength, numbness, or tremors  SKIN: No itching, burning, rashes, or lesions   LYMPH Nodes: No enlarged glands  ENDOCRINE: No heat or cold intolerance; No hair loss  MUSCULOSKELETAL: No joint pain or swelling; No muscle, back, or extremity pain    Medications:  MEDICATIONS  (STANDING):  atorvastatin 20 milliGRAM(s) Oral at bedtime  dextrose 5% + sodium chloride 0.9% with potassium chloride 20 mEq/L 1000 milliLiter(s) (50 mL/Hr) IV Continuous <Continuous>  enoxaparin Injectable 40 milliGRAM(s) SubCutaneous daily  famotidine    Tablet 20 milliGRAM(s) Oral daily  hydrochlorothiazide 25 milliGRAM(s) Oral daily  latanoprost 0.005% Ophthalmic Solution 1 Drop(s) Both EYES at bedtime  losartan 50 milliGRAM(s) Oral daily  magnesium sulfate  IVPB 2 Gram(s) IV Intermittent once  pantoprazole    Tablet 40 milliGRAM(s) Oral before breakfast  potassium chloride  10 mEq/100 mL IVPB 10 milliEquivalent(s) IV Intermittent every 1 hour    MEDICATIONS  (PRN):  ALBUTerol/ipratropium for Nebulization. 3 milliLiter(s) Nebulizer every 6 hours PRN Shortness of Breath and/or Wheezing  ondansetron Injectable 4 milliGRAM(s) IV Push every 6 hours PRN Nausea  ondansetron Injectable 4 milliGRAM(s) IV Push once PRN Nausea and/or Vomiting  oxyCODONE    IR 5 milliGRAM(s) Oral every 3 hours PRN Moderate Pain (4 - 6)    	    PHYSICAL EXAM:  T(C): 36.8 (04-29-19 @ 07:57), Max: 36.8 (04-29-19 @ 07:57)  HR: 87 (04-29-19 @ 07:57) (84 - 92)  BP: 149/65 (04-29-19 @ 07:57) (144/65 - 175/69)  RR: 18 (04-29-19 @ 07:57) (17 - 18)  SpO2: 95% (04-29-19 @ 07:57) (91% - 98%)  Wt(kg): --  I&O's Summary    28 Apr 2019 07:01  -  29 Apr 2019 07:00  --------------------------------------------------------  IN: 2330 mL / OUT: 1250 mL / NET: 1080 mL    29 Apr 2019 07:01  -  29 Apr 2019 09:38  --------------------------------------------------------  IN: 350 mL / OUT: 300 mL / NET: 50 mL        Appearance: Normal	  HEENT:   Normal oral mucosa, PERRL, EOMI	  Lymphatic: No lymphadenopathy  Cardiovascular: Normal S1 S2, No JVD, No murmurs, No edema  Respiratory: Lungs clear to auscultation	  Psychiatry: A & O x 3, Mood & affect appropriate  Gastrointestinal:  Soft, Non-tender, + BS	  Skin: No rashes, No ecchymoses, No cyanosis	  Neurologic: Non-focal  Extremities: Normal range of motion, No clubbing, cyanosis or edema  Vascular: Peripheral pulses palpable 2+ bilaterally    TELEMETRY: 	    ECG:  	  RADIOLOGY:  OTHER: 	  	  LABS:	 	    CARDIAC MARKERS:                                9.8    12.50 )-----------( 244      ( 29 Apr 2019 05:40 )             33.6     04-29    133<L>  |  94<L>  |  8   ----------------------------<  118<H>  2.8<LL>   |  28  |  0.62    Ca    9.0      29 Apr 2019 05:40  Phos  2.6     04-29  Mg     1.5     04-29      proBNP:   Lipid Profile:   HgA1c:   TSH: CHIEF COMPLAINT:Patient is a 89y old  Female who presents with a chief complaint of colon ca copd high bp (29 Apr 2019 04:24)    	        PAST MEDICAL & SURGICAL HISTORY:  Anemia  Hearing deficit, bilateral  Hepatic lesion  Disease of intestine  HTN (hypertension)  Hyperlipidemia  Glaucoma  COPD (chronic obstructive pulmonary disease)  H/O blepharoplasty: BILATERAL  S/P eye surgery: s/p corneal transplant, right  Bilateral cataracts          REVIEW OF SYSTEMS:  CONSTITUTIONAL: No fever, weight loss, or fatigue  EYES: No eye pain, visual disturbances, or discharge  NECK: No pain or stiffness  RESPIRATORY: No cough, wheezing, chills or hemoptysis; No Shortness of Breath  CARDIOVASCULAR: No chest pain, palpitations, passing out, dizziness, or leg swelling  GASTROINTESTINALdec abd pain    No nausea, vomiting, or hematemesis;   small bm   GENITOURINARY: No dysuria, frequency, hematuria, or incontinence  NEUROLOGICAL: No headaches,     MUSCULOSKELETAL: No joint pain or swelling; No muscle, back, or extremity pain    Medications:  MEDICATIONS  (STANDING):  atorvastatin 20 milliGRAM(s) Oral at bedtime  dextrose 5% + sodium chloride 0.9% with potassium chloride 20 mEq/L 1000 milliLiter(s) (50 mL/Hr) IV Continuous <Continuous>  enoxaparin Injectable 40 milliGRAM(s) SubCutaneous daily  famotidine    Tablet 20 milliGRAM(s) Oral daily  hydrochlorothiazide 25 milliGRAM(s) Oral daily  latanoprost 0.005% Ophthalmic Solution 1 Drop(s) Both EYES at bedtime  losartan 50 milliGRAM(s) Oral daily  magnesium sulfate  IVPB 2 Gram(s) IV Intermittent once  pantoprazole    Tablet 40 milliGRAM(s) Oral before breakfast  potassium chloride  10 mEq/100 mL IVPB 10 milliEquivalent(s) IV Intermittent every 1 hour    MEDICATIONS  (PRN):  ALBUTerol/ipratropium for Nebulization. 3 milliLiter(s) Nebulizer every 6 hours PRN Shortness of Breath and/or Wheezing  ondansetron Injectable 4 milliGRAM(s) IV Push every 6 hours PRN Nausea  ondansetron Injectable 4 milliGRAM(s) IV Push once PRN Nausea and/or Vomiting  oxyCODONE    IR 5 milliGRAM(s) Oral every 3 hours PRN Moderate Pain (4 - 6)    	    PHYSICAL EXAM:  T(C): 36.8 (04-29-19 @ 07:57), Max: 36.8 (04-29-19 @ 07:57)  HR: 87 (04-29-19 @ 07:57) (84 - 92)  BP: 149/65 (04-29-19 @ 07:57) (144/65 - 175/69)  RR: 18 (04-29-19 @ 07:57) (17 - 18)  SpO2: 95% (04-29-19 @ 07:57) (91% - 98%)  Wt(kg): --  I&O's Summary    28 Apr 2019 07:01  -  29 Apr 2019 07:00  --------------------------------------------------------  IN: 2330 mL / OUT: 1250 mL / NET: 1080 mL    29 Apr 2019 07:01  -  29 Apr 2019 09:38  --------------------------------------------------------  IN: 350 mL / OUT: 300 mL / NET: 50 mL        Appearance: Normal	  HEENT:   Normal oral mucosa, PERRL, EOMI	  Lymphatic: No lymphadenopathy  Cardiovascular: Normal S1 S2, No JVD, No murmurs, No edema  Respiratory: Lungs clear to auscultation	  Psychiatry: A & O x 3, Mood & affect appropriate  Gastrointestinal:  Soft, Non-tender, + BS	  Skin: No rashes, No ecchymoses, No cyanosis	  Neurologic: Non-focal  Extremities: Normal range of motion, No clubbing, cyanosis or edema  Vascular: Peripheral pulses palpable 2+ bilaterally    TELEMETRY: 	    ECG:  	  RADIOLOGY:  OTHER: 	  	  LABS:	 	    CARDIAC MARKERS:                                9.8    12.50 )-----------( 244      ( 29 Apr 2019 05:40 )             33.6     04-29    133<L>  |  94<L>  |  8   ----------------------------<  118<H>  2.8<LL>   |  28  |  0.62    Ca    9.0      29 Apr 2019 05:40  Phos  2.6     04-29  Mg     1.5     04-29      proBNP:   Lipid Profile:   HgA1c:   TSH:

## 2019-04-30 ENCOUNTER — TRANSCRIPTION ENCOUNTER (OUTPATIENT)
Age: 84
End: 2019-04-30

## 2019-04-30 LAB
ANION GAP SERPL CALC-SCNC: 11 MMO/L — SIGNIFICANT CHANGE UP (ref 7–14)
ANION GAP SERPL CALC-SCNC: 13 MMO/L — SIGNIFICANT CHANGE UP (ref 7–14)
BUN SERPL-MCNC: 12 MG/DL — SIGNIFICANT CHANGE UP (ref 7–23)
BUN SERPL-MCNC: 17 MG/DL — SIGNIFICANT CHANGE UP (ref 7–23)
CALCIUM SERPL-MCNC: 8.9 MG/DL — SIGNIFICANT CHANGE UP (ref 8.4–10.5)
CALCIUM SERPL-MCNC: 9.3 MG/DL — SIGNIFICANT CHANGE UP (ref 8.4–10.5)
CHLORIDE SERPL-SCNC: 94 MMOL/L — LOW (ref 98–107)
CHLORIDE SERPL-SCNC: 95 MMOL/L — LOW (ref 98–107)
CO2 SERPL-SCNC: 26 MMOL/L — SIGNIFICANT CHANGE UP (ref 22–31)
CO2 SERPL-SCNC: 28 MMOL/L — SIGNIFICANT CHANGE UP (ref 22–31)
CREAT SERPL-MCNC: 0.7 MG/DL — SIGNIFICANT CHANGE UP (ref 0.5–1.3)
CREAT SERPL-MCNC: 0.8 MG/DL — SIGNIFICANT CHANGE UP (ref 0.5–1.3)
GLUCOSE SERPL-MCNC: 117 MG/DL — HIGH (ref 70–99)
GLUCOSE SERPL-MCNC: 94 MG/DL — SIGNIFICANT CHANGE UP (ref 70–99)
HCT VFR BLD CALC: 31 % — LOW (ref 34.5–45)
HCT VFR BLD CALC: 31.1 % — LOW (ref 34.5–45)
HGB BLD-MCNC: 9.1 G/DL — LOW (ref 11.5–15.5)
HGB BLD-MCNC: 9.2 G/DL — LOW (ref 11.5–15.5)
MAGNESIUM SERPL-MCNC: 1.8 MG/DL — SIGNIFICANT CHANGE UP (ref 1.6–2.6)
MAGNESIUM SERPL-MCNC: 1.8 MG/DL — SIGNIFICANT CHANGE UP (ref 1.6–2.6)
MCHC RBC-ENTMCNC: 19.4 PG — LOW (ref 27–34)
MCHC RBC-ENTMCNC: 19.5 PG — LOW (ref 27–34)
MCHC RBC-ENTMCNC: 29.4 % — LOW (ref 32–36)
MCHC RBC-ENTMCNC: 29.6 % — LOW (ref 32–36)
MCV RBC AUTO: 65.8 FL — LOW (ref 80–100)
MCV RBC AUTO: 66.2 FL — LOW (ref 80–100)
NRBC # FLD: 0 K/UL — SIGNIFICANT CHANGE UP (ref 0–0)
NRBC # FLD: 0.03 K/UL — SIGNIFICANT CHANGE UP (ref 0–0)
PHOSPHATE SERPL-MCNC: 2.6 MG/DL — SIGNIFICANT CHANGE UP (ref 2.5–4.5)
PHOSPHATE SERPL-MCNC: 3 MG/DL — SIGNIFICANT CHANGE UP (ref 2.5–4.5)
PLATELET # BLD AUTO: 202 K/UL — SIGNIFICANT CHANGE UP (ref 150–400)
PLATELET # BLD AUTO: 209 K/UL — SIGNIFICANT CHANGE UP (ref 150–400)
PMV BLD: SIGNIFICANT CHANGE UP FL (ref 7–13)
PMV BLD: SIGNIFICANT CHANGE UP FL (ref 7–13)
POTASSIUM SERPL-MCNC: 3.3 MMOL/L — LOW (ref 3.5–5.3)
POTASSIUM SERPL-MCNC: 3.5 MMOL/L — SIGNIFICANT CHANGE UP (ref 3.5–5.3)
POTASSIUM SERPL-SCNC: 3.3 MMOL/L — LOW (ref 3.5–5.3)
POTASSIUM SERPL-SCNC: 3.5 MMOL/L — SIGNIFICANT CHANGE UP (ref 3.5–5.3)
RBC # BLD: 4.68 M/UL — SIGNIFICANT CHANGE UP (ref 3.8–5.2)
RBC # BLD: 4.73 M/UL — SIGNIFICANT CHANGE UP (ref 3.8–5.2)
RBC # FLD: 22.7 % — HIGH (ref 10.3–14.5)
RBC # FLD: 22.8 % — HIGH (ref 10.3–14.5)
SODIUM SERPL-SCNC: 133 MMOL/L — LOW (ref 135–145)
SODIUM SERPL-SCNC: 134 MMOL/L — LOW (ref 135–145)
SURGICAL PATHOLOGY STUDY: SIGNIFICANT CHANGE UP
WBC # BLD: 13.88 K/UL — HIGH (ref 3.8–10.5)
WBC # BLD: 17.07 K/UL — HIGH (ref 3.8–10.5)
WBC # FLD AUTO: 13.88 K/UL — HIGH (ref 3.8–10.5)
WBC # FLD AUTO: 17.07 K/UL — HIGH (ref 3.8–10.5)

## 2019-04-30 PROCEDURE — 74018 RADEX ABDOMEN 1 VIEW: CPT | Mod: 26

## 2019-04-30 RX ORDER — ENOXAPARIN SODIUM 100 MG/ML
40 INJECTION SUBCUTANEOUS
Qty: 1120 | Refills: 0 | OUTPATIENT
Start: 2019-04-30 | End: 2019-05-27

## 2019-04-30 RX ORDER — POTASSIUM CHLORIDE 20 MEQ
40 PACKET (EA) ORAL ONCE
Qty: 0 | Refills: 0 | Status: COMPLETED | OUTPATIENT
Start: 2019-04-30 | End: 2019-04-30

## 2019-04-30 RX ORDER — OXYCODONE HYDROCHLORIDE 5 MG/1
1 TABLET ORAL
Qty: 8 | Refills: 0 | OUTPATIENT
Start: 2019-04-30 | End: 2019-05-01

## 2019-04-30 RX ORDER — ACETAMINOPHEN 500 MG
2 TABLET ORAL
Qty: 24 | Refills: 0 | OUTPATIENT
Start: 2019-04-30 | End: 2019-05-02

## 2019-04-30 RX ORDER — POTASSIUM PHOSPHATE, MONOBASIC POTASSIUM PHOSPHATE, DIBASIC 236; 224 MG/ML; MG/ML
15 INJECTION, SOLUTION INTRAVENOUS ONCE
Qty: 0 | Refills: 0 | Status: DISCONTINUED | OUTPATIENT
Start: 2019-04-30 | End: 2019-05-01

## 2019-04-30 RX ADMIN — Medication 25 MILLIGRAM(S): at 05:24

## 2019-04-30 RX ADMIN — Medication 40 MILLIEQUIVALENT(S): at 10:43

## 2019-04-30 RX ADMIN — Medication 63.75 MILLIMOLE(S): at 22:28

## 2019-04-30 RX ADMIN — LATANOPROST 1 DROP(S): 0.05 SOLUTION/ DROPS OPHTHALMIC; TOPICAL at 22:28

## 2019-04-30 RX ADMIN — Medication 30 MILLILITER(S): at 10:43

## 2019-04-30 RX ADMIN — LOSARTAN POTASSIUM 50 MILLIGRAM(S): 100 TABLET, FILM COATED ORAL at 05:24

## 2019-04-30 RX ADMIN — ATORVASTATIN CALCIUM 20 MILLIGRAM(S): 80 TABLET, FILM COATED ORAL at 22:28

## 2019-04-30 RX ADMIN — FAMOTIDINE 20 MILLIGRAM(S): 10 INJECTION INTRAVENOUS at 17:26

## 2019-04-30 RX ADMIN — PANTOPRAZOLE SODIUM 40 MILLIGRAM(S): 20 TABLET, DELAYED RELEASE ORAL at 05:24

## 2019-04-30 RX ADMIN — ENOXAPARIN SODIUM 40 MILLIGRAM(S): 100 INJECTION SUBCUTANEOUS at 11:25

## 2019-04-30 NOTE — PROGRESS NOTE ADULT - ASSESSMENT
89 y/o female with colon cancer s/p extended right colectomy with primary anastomosis and microwave ablation of liver lesion     - pain control  diet as per sx  oob  dvt proph  copd stable  htn stable  resumed meds   hyponatremia improved  repeat bmp improved  hypokalemia  supp k + prn  hypomag  supp mg prn  analgesics  pt  ppi  d/c as per sx No

## 2019-04-30 NOTE — PROGRESS NOTE ADULT - SUBJECTIVE AND OBJECTIVE BOX
Surgery Progress Note      Subjective: Patient seen and examined. No acute events overnight.   Received Lovenox teaching yesterday; patient states that while she can perform her own shots, she is not as comfortable and would prefer someone else to do them  Had BM this AM    + Flatus / + BM    T(C): 36.4 (04-30-19 @ 05:22), Max: 36.8 (04-29-19 @ 07:57)  HR: 87 (04-30-19 @ 05:22) (75 - 87)  BP: 148/65 (04-30-19 @ 05:22) (146/62 - 160/63)  RR: 16 (04-30-19 @ 05:22) (16 - 18)  SpO2: 99% (04-30-19 @ 05:22) (95% - 100%)      04-29-19 @ 07:01  -  04-30-19 @ 07:00  --------------------------------------------------------  IN: 500 mL / OUT: 750 mL / NET: -250 mL        Physical Exam:   General: NAD  Abdomen: soft, not distended, midline incision with staples - c/d/i    Labs:                          9.8    12.50 )-----------( 244      ( 29 Apr 2019 05:40 )             33.6     04-29    131<L>  |  92<L>  |  10  ----------------------------<  120<H>  3.0<L>   |  26  |  0.68    Ca    9.1      29 Apr 2019 15:27  Phos  2.7     04-29  Mg     2.3     04-29        Medications:     ALBUTerol/ipratropium for Nebulization. 3 milliLiter(s) Nebulizer every 6 hours PRN  atorvastatin 20 milliGRAM(s) Oral at bedtime  enoxaparin Injectable 40 milliGRAM(s) SubCutaneous daily  famotidine    Tablet 20 milliGRAM(s) Oral daily  hydrochlorothiazide 25 milliGRAM(s) Oral daily  latanoprost 0.005% Ophthalmic Solution 1 Drop(s) Both EYES at bedtime  losartan 50 milliGRAM(s) Oral daily  oxyCODONE    IR 5 milliGRAM(s) Oral every 3 hours PRN  pantoprazole    Tablet 40 milliGRAM(s) Oral before breakfast      Radiographs: No new imaging

## 2019-04-30 NOTE — DIETITIAN INITIAL EVALUATION ADULT. - OTHER INFO
Pt states that she was advanced to regular diet yesterday but breakfast today was the first solid meal she has eaten. She states she has tolerated it well but did experience some bloating. Daughter states that she has been experiencing bloating from prior to surgery but today's discomfort was more than usual. Pt had no complaints of GI distress nor difficulty chewing or swallowing.

## 2019-04-30 NOTE — PROGRESS NOTE ADULT - SUBJECTIVE AND OBJECTIVE BOX
Subjective: Patient is a 89y old  Female  with hypertension Copd hyperlipidemia who presents with a chief complaint weight loss anemia i performed a colonoscopy  found iron deficency anemia obstructing tumor hepatic flexure day 7 post op with microablation of Liver   long discussion with patient wants to go home with therapy at home still not on food PAST MEDICAL & SURGICAL HISTORY:  Anemia  Hearing deficit, bilateral  Hepatic lesion  Disease of intestine  HTN (hypertension)  Hyperlipidemia  Glaucoma  COPD (chronic obstructive pulmonary disease)  H/O blepharoplasty: BILATERAL  S/P eye surgery: s/p corneal transplant, right  Bilateral cataracts      Allergies    No Known Allergies    Intolerances        MEDICATIONS  (STANDING):  lactated ringers. 1000 milliLiter(s) (30 mL/Hr) IV Continuous <Continuous>  sodium chloride 0.9% lock flush 3 milliLiter(s) IV Push every 8 hours    MEDICATIONS  (PRN):      CONSTITUTIONAL: No fever,20lb some  fatigue  EYES: No eye pain, visual disturbances, or discharge  ENMT:  No difficulty hearing, tinnitus, vertigo; No sinus or throat pain  NECK: No pain or stiffness  BREASTS: No pain, masses, or nipple discharge  RESPIRATORY: No cough, wheezing, chills or hemoptysis; No shortness of breath  CARDIOVASCULAR: No chest pain, palpitations, dizziness, or leg swelling  GASTROINTESTINAL: No abdominal or epigastric pain. No nausea, vomiting, or hematemesis; No diarrhea or constipation. No melena or hematochezia.  GENITOURINARY: No dysuria, frequency, hematuria, or incontinence  NEUROLOGICAL: No headaches, memory loss, loss of strength, numbness, or tremors  SKIN: No itching, burning, rashes, or lesions   LYMPH NODES: No enlarged glands  ENDOCRINE: No heat or cold intolerance; No hair loss  MUSCULOSKELETAL: No joint pain or swelling; No muscle, back, or extremity pain  PSYCHIATRIC: No depression, anxiety, mood swings, or difficulty sleeping  HEME/LYMPH: No easy bruising, or bleeding gums  ALLERY AND IMMUNOLOGIC: No hives or eczema      PHYSICAL EXAM:    GENERAL: Comfortable, no acute distress pale HEAD:  Normocephalic, atraumatic afebrile vs stable   EYES: EOMI, PERRLA  Basic Metabolic Panel w/Mg &amp; Inorg Phos (04.29.19 @ 15:27)    eGFR if : 89 mL/min    eGFR if Non : 76: The units for eGFR are ml/min/1.73m2 (normalized body  surface area). The eGFR is calculated from a serum  creatinine using the CKD-EPI equation. Other variables  required for calculation are race, age and sex. Among  patients with chronic kidney disease (CKD), the eGFR is  useful in determining the stage of disease according to  KDOQI CKD classification. All eGFR results are reported  numerically with the following interpretation.    GFR  (ml/min/1.73 m2)          W/KIDNEY DAMAGE    W/O KIDNEY DMG  ==========================================================  >= 90.......................Stage 1..............Normal  60-89.......................Stage 2...........Decreased GFR  30-59.......................Stage 3..............Stage 3  15-29.......................Stage 4..............Stage 4  < 15........................Stage 5..............Stage 5    Each stage of CKD assumes that the associated GFR level  has been in effect for at least 3 months. Determination of  stages one and two (with eGFR > 59ml/min/m2) requires  estimation of kidney damage for at least 3 months as  defined by structural or functional abnormalities.    Limitations: All estimates of GFR will be less accurate  for patients at extremes of muscle mass (including but  not limited to frail elderly, critically ill, or cancer  patients), those with unusual diets, and those with  conditions associated with reduced secretion or  extrarenal elimination of creatinine. The eGFR equation  is not recommended for use in patients with unstable  creatinine levels. mL/min    Calcium, Total Serum: 9.1 mg/dL    Phosphorus Level, Serum: 2.7 mg/dL    Sodium, Serum: 131 mmol/L    Potassium, Serum: 3.0 mmol/L    Chloride, Serum: 92 mmol/L    Carbon Dioxide, Serum: 26 mmol/L    Anion Gap, Serum: 13 mmo/L    Blood Urea Nitrogen, Serum: 10 mg/dL    Creatinine, Serum: 0.68 mg/dL    Glucose, Serum: 120 mg/dL    Magnesium, Serum: 2.3 mg/dL  Complete Blood Count in AM (04.29.19 @ 05:40)    WBC Count: 12.50 K/uL    RBC Count: 5.02 M/uL    Hemoglobin: 9.8 g/dL    Hematocrit: 33.6 %    Mean Cell Volume: 66.9 fL    Mean Cell Hemoglobin: 19.5 pg    Mean Cell Hemoglobin Conc: 29.2 %    Red Cell Distrib Width: 22.7 %    Platelet Count - Automated: 244 K/uL    MPV: Test not performed fl    Nucleated RBC #: 0 K/uL    vs stable afebrile   HEENT: Moist mucous membranes  NECK: Supple, No JVD  NERVOUS SYSTEM:  Alert & Oriented X3, Motor Strength 5/5 B/L upper and lower extremities  CHEST/LUNG: Clear to auscultation bilaterally  HEART: Regular rate and rhythm  ABDOMEN: Soft, Nontender,distended,  wound dressed  GENITOURINARY: Voiding, no palpable bladder  EXTREMITIES:   No clubbing, cyanosis, or edema  MUSCULOSKELTAL- No muscle tenderness, no joint tenderness  SKIN-no rash Basic Metabolic Panel w/Mg &amp; Inorg Phos (04.29.19 @ 15:27) fall

## 2019-04-30 NOTE — PROGRESS NOTE ADULT - ASSESSMENT
89F with colon cancer s/p extended right colectomy with primary stapled anastomosis and microwave ablation of liver lesion (4/23)    - pain control  - regular diet  - lovenox for DVT ppx  - OOB and ambulating as tolerating  - PT-->home with home PT and rolling walker  - discharge planning with lovenox    D Team  [id number]

## 2019-04-30 NOTE — DISCHARGE NOTE PROVIDER - NSDCFUADDINST_GEN_ALL_CORE_FT
WOUND CARE:  Please keep incisions clean and dry. Please do not Scrub or rub incisions. Do not use lotion or powder on incisions.   BATHING: You may shower and/or sponge bathe. You may use warm soapy water in the shower and rinse, pat dry.  ACTIVITY: No heavy lifting or straining. Otherwise, you may return to your usual level of physical activity. If you are taking narcotic pain medication DO NOT drive a car, operate machinery or make important decisions.  DIET: Return to your usual diet.  NOTIFY YOUR SURGEON IF: You have any bleeding that does not stop, any pus draining from your wound(s), increased pain at surgical site, any fever (over 100.4 F) persistent nausea/vomiting, or if your pain is not controlled on your discharge pain medications.  Please follow up with your primary care physician in 1-2 weeks regarding your hospitalization.  Please follow up with your surgeon, Dr. Odonnell in 1 week. Please call office to make an appointment.

## 2019-04-30 NOTE — DISCHARGE NOTE PROVIDER - HOSPITAL COURSE
89 y/o female with COPD, HTN, HTN and Glaucoma presents to [facility] for preoperative evaluation with dx of disease of intestine. Pt presented to her PCP with anemia. She had a CT abdomen/ pelvis followed by a colonoscopy which confirmed obstructing colon mass and right hepatic lesion. Scheduled for Laparotomy Extended Right Colectomy, Possible Liver Resection or Radiofrequency Ablation on 4/23/2019        On 4/23 pt underwent open resection of ascending colon and extended right colectomy with Microwave ablation of liver mass. On POD #2 pts khan was removed and she passed a TOV. On POD #3 pts PCA was removed and her pain was well controlled on oral pain medication. During hospital course patients diet was slowly advanced as tolerated.  At this time, pt is tolerating a regular diet, ambulating and voiding.  Pt was assessed by PT who recommended pt be discharged home with rolling walker. Pt has been deemed stable for discharge at this time.

## 2019-04-30 NOTE — PROGRESS NOTE ADULT - ASSESSMENT
day 6 post op ca colon in a n elderly patient with Copd hypertension has low k still with poor po intake  replace k advance diet as tolerated dvt prophylaxis pt  home care being arranged

## 2019-04-30 NOTE — DISCHARGE NOTE PROVIDER - CARE PROVIDER_API CALL
Salbador Odonnell)  ColonRectal Surgery; Surgery  [address] [place] [address]  Phone: [phone]  Fax: [phone]  Follow Up Time: 1 week

## 2019-04-30 NOTE — DISCHARGE NOTE PROVIDER - NSDCCPTREATMENT_GEN_ALL_CORE_FT
PRINCIPAL PROCEDURE  Procedure: Open resection of ascending colon  Findings and Treatment: extended right colectomy      SECONDARY PROCEDURE  Procedure: Microwave ablation, mass, liver  Findings and Treatment:

## 2019-04-30 NOTE — PROGRESS NOTE ADULT - SUBJECTIVE AND OBJECTIVE BOX
CHIEF COMPLAINT:Patient is a 89y old  Female who presents with a chief complaint of ca colon hypertension copd (30 Apr 2019 04:28)    	        PAST MEDICAL & SURGICAL HISTORY:  Anemia  Hearing deficit, bilateral  Hepatic lesion  Disease of intestine  HTN (hypertension)  Hyperlipidemia  Glaucoma  COPD (chronic obstructive pulmonary disease)  H/O blepharoplasty: BILATERAL  S/P eye surgery: s/p corneal transplant, right  Bilateral cataracts          REVIEW OF SYSTEMS:  CONSTITUTIONAL: feels better   EYES: No eye pain, visual disturbances, or discharge  NECK: No pain or stiffness  RESPIRATORY: No cough, wheezing, chills or hemoptysis; No Shortness of Breath  CARDIOVASCULAR: No chest pain, palpitations, passing out, dizziness, or leg swelling  GASTROINTESTINAL: mild abd discomfort  + bm  GENITOURINARY: No dysuria, frequency, hematuria, or incontinence  NEUROLOGICAL: No headaches,     MUSCULOSKELETAL: No joint pain or swelling; No muscle, back, or extremity pain    Medications:  MEDICATIONS  (STANDING):  aluminum hydroxide/magnesium hydroxide/simethicone Suspension 30 milliLiter(s) Oral once  atorvastatin 20 milliGRAM(s) Oral at bedtime  enoxaparin Injectable 40 milliGRAM(s) SubCutaneous daily  famotidine    Tablet 20 milliGRAM(s) Oral daily  hydrochlorothiazide 25 milliGRAM(s) Oral daily  latanoprost 0.005% Ophthalmic Solution 1 Drop(s) Both EYES at bedtime  losartan 50 milliGRAM(s) Oral daily  pantoprazole    Tablet 40 milliGRAM(s) Oral before breakfast  potassium chloride    Tablet ER 40 milliEquivalent(s) Oral once    MEDICATIONS  (PRN):  ALBUTerol/ipratropium for Nebulization. 3 milliLiter(s) Nebulizer every 6 hours PRN Shortness of Breath and/or Wheezing  oxyCODONE    IR 5 milliGRAM(s) Oral every 3 hours PRN Moderate Pain (4 - 6)    	    PHYSICAL EXAM:  T(C): 36.4 (04-30-19 @ 09:01), Max: 36.8 (04-29-19 @ 11:35)  HR: 84 (04-30-19 @ 09:01) (75 - 87)  BP: 147/55 (04-30-19 @ 09:01) (146/62 - 160/63)  RR: 18 (04-30-19 @ 09:01) (16 - 18)  SpO2: 94% (04-30-19 @ 09:01) (94% - 100%)  Wt(kg): --  I&O's Summary    29 Apr 2019 07:01  -  30 Apr 2019 07:00  --------------------------------------------------------  IN: 500 mL / OUT: 750 mL / NET: -250 mL        Appearance: Normal	  HEENT:   Normal oral mucosa, PERRL, EOMI	  Lymphatic: No lymphadenopathy  Cardiovascular: Normal S1 S2, No JVD, No murmurs, No edema  Respiratory: Lungs clear to auscultation	  Psychiatry: A & O x 3,  Gastrointestinal:  Soft, Non-tender, + BS	  Skin: No rashes, No ecchymoses, No cyanosis	  Neurologic: Non-focal  Extremities: Normal range of motion, No clubbing, cyanosis or edema  Vascular: Peripheral pulses palpable 2+ bilaterally    TELEMETRY: 	    ECG:  	  RADIOLOGY:  OTHER: 	  	  LABS:	 	    CARDIAC MARKERS:                                9.1    13.88 )-----------( 202      ( 30 Apr 2019 06:30 )             31.0     04-30    133<L>  |  94<L>  |  12  ----------------------------<  94  3.3<L>   |  28  |  0.70    Ca    8.9      30 Apr 2019 06:30  Phos  3.0     04-30  Mg     1.8     04-30      proBNP:   Lipid Profile:   HgA1c:   TSH:

## 2019-05-01 VITALS
RESPIRATION RATE: 18 BRPM | TEMPERATURE: 98 F | DIASTOLIC BLOOD PRESSURE: 62 MMHG | OXYGEN SATURATION: 96 % | SYSTOLIC BLOOD PRESSURE: 141 MMHG | HEART RATE: 82 BPM

## 2019-05-01 LAB
ANION GAP SERPL CALC-SCNC: 11 MMO/L — SIGNIFICANT CHANGE UP (ref 7–14)
BUN SERPL-MCNC: 21 MG/DL — SIGNIFICANT CHANGE UP (ref 7–23)
CALCIUM SERPL-MCNC: 9 MG/DL — SIGNIFICANT CHANGE UP (ref 8.4–10.5)
CHLORIDE SERPL-SCNC: 96 MMOL/L — LOW (ref 98–107)
CO2 SERPL-SCNC: 26 MMOL/L — SIGNIFICANT CHANGE UP (ref 22–31)
CREAT SERPL-MCNC: 0.79 MG/DL — SIGNIFICANT CHANGE UP (ref 0.5–1.3)
GLUCOSE SERPL-MCNC: 107 MG/DL — HIGH (ref 70–99)
HCT VFR BLD CALC: 30.3 % — LOW (ref 34.5–45)
HGB BLD-MCNC: 8.9 G/DL — LOW (ref 11.5–15.5)
MAGNESIUM SERPL-MCNC: 1.7 MG/DL — SIGNIFICANT CHANGE UP (ref 1.6–2.6)
MCHC RBC-ENTMCNC: 19.4 PG — LOW (ref 27–34)
MCHC RBC-ENTMCNC: 29.4 % — LOW (ref 32–36)
MCV RBC AUTO: 66 FL — LOW (ref 80–100)
NRBC # FLD: 0 K/UL — SIGNIFICANT CHANGE UP (ref 0–0)
PHOSPHATE SERPL-MCNC: 3.8 MG/DL — SIGNIFICANT CHANGE UP (ref 2.5–4.5)
PLATELET # BLD AUTO: 224 K/UL — SIGNIFICANT CHANGE UP (ref 150–400)
PMV BLD: SIGNIFICANT CHANGE UP FL (ref 7–13)
POTASSIUM SERPL-MCNC: 3.3 MMOL/L — LOW (ref 3.5–5.3)
POTASSIUM SERPL-SCNC: 3.3 MMOL/L — LOW (ref 3.5–5.3)
RBC # BLD: 4.59 M/UL — SIGNIFICANT CHANGE UP (ref 3.8–5.2)
RBC # FLD: 22.9 % — HIGH (ref 10.3–14.5)
SODIUM SERPL-SCNC: 133 MMOL/L — LOW (ref 135–145)
WBC # BLD: 15.74 K/UL — HIGH (ref 3.8–10.5)
WBC # FLD AUTO: 15.74 K/UL — HIGH (ref 3.8–10.5)

## 2019-05-01 RX ORDER — POTASSIUM CHLORIDE 20 MEQ
40 PACKET (EA) ORAL ONCE
Qty: 0 | Refills: 0 | Status: DISCONTINUED | OUTPATIENT
Start: 2019-05-01 | End: 2019-05-01

## 2019-05-01 RX ORDER — MAGNESIUM SULFATE 500 MG/ML
2 VIAL (ML) INJECTION ONCE
Qty: 0 | Refills: 0 | Status: COMPLETED | OUTPATIENT
Start: 2019-05-01 | End: 2019-05-01

## 2019-05-01 RX ORDER — POTASSIUM CHLORIDE 20 MEQ
20 PACKET (EA) ORAL
Qty: 0 | Refills: 0 | Status: COMPLETED | OUTPATIENT
Start: 2019-05-01 | End: 2019-05-01

## 2019-05-01 RX ORDER — PANTOPRAZOLE SODIUM 20 MG/1
1 TABLET, DELAYED RELEASE ORAL
Qty: 30 | Refills: 0 | OUTPATIENT
Start: 2019-05-01 | End: 2019-05-30

## 2019-05-01 RX ORDER — SODIUM,POTASSIUM PHOSPHATES 278-250MG
1 POWDER IN PACKET (EA) ORAL
Qty: 0 | Refills: 0 | Status: COMPLETED | OUTPATIENT
Start: 2019-05-01 | End: 2019-05-01

## 2019-05-01 RX ADMIN — PANTOPRAZOLE SODIUM 40 MILLIGRAM(S): 20 TABLET, DELAYED RELEASE ORAL at 05:47

## 2019-05-01 RX ADMIN — Medication 50 GRAM(S): at 12:13

## 2019-05-01 RX ADMIN — LOSARTAN POTASSIUM 50 MILLIGRAM(S): 100 TABLET, FILM COATED ORAL at 05:47

## 2019-05-01 RX ADMIN — Medication 25 MILLIGRAM(S): at 05:47

## 2019-05-01 RX ADMIN — Medication 20 MILLIEQUIVALENT(S): at 08:45

## 2019-05-01 RX ADMIN — Medication 1 PACKET(S): at 08:44

## 2019-05-01 RX ADMIN — FAMOTIDINE 20 MILLIGRAM(S): 10 INJECTION INTRAVENOUS at 11:23

## 2019-05-01 RX ADMIN — Medication 20 MILLIEQUIVALENT(S): at 05:47

## 2019-05-01 RX ADMIN — Medication 1 PACKET(S): at 05:47

## 2019-05-01 RX ADMIN — ENOXAPARIN SODIUM 40 MILLIGRAM(S): 100 INJECTION SUBCUTANEOUS at 11:23

## 2019-05-01 NOTE — PROGRESS NOTE ADULT - ASSESSMENT
day 6 post op ca colon in a n elderly patient with Copd hypertension has low k still with poor po intake  replace k advance diet as tolerated dvt prophylaxis pt  home care being arranged day 8 postop

## 2019-05-01 NOTE — PROGRESS NOTE ADULT - PROBLEM SELECTOR PLAN 4
continue current   meds

## 2019-05-01 NOTE — PROGRESS NOTE ADULT - SUBJECTIVE AND OBJECTIVE BOX
Surgery Progress Note      Subjective: Patient seen and examined. No acute events overnight.   few episodes of blood clot with BM    T(C): 36.4 (05-01-19 @ 00:08), Max: 36.7 (04-30-19 @ 16:05)  HR: 83 (05-01-19 @ 00:08) (83 - 103)  BP: 143/61 (05-01-19 @ 00:08) (143/61 - 151/52)  RR: 18 (05-01-19 @ 00:08) (16 - 18)  SpO2: 99% (05-01-19 @ 00:08) (94% - 99%)      04-29-19 @ 07:01  -  04-30-19 @ 07:00  --------------------------------------------------------  IN: 500 mL / OUT: 750 mL / NET: -250 mL        Physical Exam:     General: NAD  Abdomen: soft, not distended, midline incision with staples - c/d/i    Labs:                          9.2    17.07 )-----------( 209      ( 30 Apr 2019 16:44 )             31.1     04-30    134<L>  |  95<L>  |  17  ----------------------------<  117<H>  3.5   |  26  |  0.80    Ca    9.3      30 Apr 2019 16:44  Phos  2.6     04-30  Mg     1.8     04-30          Medications:     ALBUTerol/ipratropium for Nebulization. 3 milliLiter(s) Nebulizer every 6 hours PRN  atorvastatin 20 milliGRAM(s) Oral at bedtime  enoxaparin Injectable 40 milliGRAM(s) SubCutaneous daily  famotidine    Tablet 20 milliGRAM(s) Oral daily  hydrochlorothiazide 25 milliGRAM(s) Oral daily  latanoprost 0.005% Ophthalmic Solution 1 Drop(s) Both EYES at bedtime  losartan 50 milliGRAM(s) Oral daily  oxyCODONE    IR 5 milliGRAM(s) Oral every 3 hours PRN  pantoprazole    Tablet 40 milliGRAM(s) Oral before breakfast  potassium phosphate IVPB 15 milliMole(s) IV Intermittent once      Radiographs: No new imaging

## 2019-05-01 NOTE — PROGRESS NOTE ADULT - PROBLEM SELECTOR PLAN 3
continue current meds monitoring 02

## 2019-05-01 NOTE — PROGRESS NOTE ADULT - SUBJECTIVE AND OBJECTIVE BOX
CHIEF COMPLAINT:Patient is a 89y old  Female who presents with a chief complaint of colon ca copd hypertension (01 May 2019 04:18)    	        PAST MEDICAL & SURGICAL HISTORY:  Anemia  Hearing deficit, bilateral  Hepatic lesion  Disease of intestine  HTN (hypertension)  Hyperlipidemia  Glaucoma  COPD (chronic obstructive pulmonary disease)  H/O blepharoplasty: BILATERAL  S/P eye surgery: s/p corneal transplant, right  Bilateral cataracts          REVIEW OF SYSTEMS:  CONSTITUTIONAL: No fever, weight loss, or fatigue  EYES: No eye pain, visual disturbances, or discharge  NECK: No pain or stiffness  RESPIRATORY: No cough, wheezing, chills or hemoptysis; No Shortness of Breath  CARDIOVASCULAR: No chest pain, palpitations, passing out, dizziness, or leg swelling  GASTROINTESTINAL: No abdominal or epigastric pain. No nausea, vomiting, or hematemesis; No diarrhea or constipation. No melena or hematochezia.  GENITOURINARY: No dysuria, frequency, hematuria, or incontinence  NEUROLOGICAL: No headaches, memory loss, loss of strength, numbness, or tremors  SKIN: No itching, burning, rashes, or lesions   LYMPH Nodes: No enlarged glands  ENDOCRINE: No heat or cold intolerance; No hair loss  MUSCULOSKELETAL: No joint pain or swelling; No muscle, back, or extremity pain    Medications:  MEDICATIONS  (STANDING):  atorvastatin 20 milliGRAM(s) Oral at bedtime  enoxaparin Injectable 40 milliGRAM(s) SubCutaneous daily  famotidine    Tablet 20 milliGRAM(s) Oral daily  hydrochlorothiazide 25 milliGRAM(s) Oral daily  latanoprost 0.005% Ophthalmic Solution 1 Drop(s) Both EYES at bedtime  losartan 50 milliGRAM(s) Oral daily  pantoprazole    Tablet 40 milliGRAM(s) Oral before breakfast  potassium chloride    Tablet ER 40 milliEquivalent(s) Oral once    MEDICATIONS  (PRN):  ALBUTerol/ipratropium for Nebulization. 3 milliLiter(s) Nebulizer every 6 hours PRN Shortness of Breath and/or Wheezing  oxyCODONE    IR 5 milliGRAM(s) Oral every 3 hours PRN Moderate Pain (4 - 6)    	    PHYSICAL EXAM:  T(C): 36.7 (05-01-19 @ 08:29), Max: 36.7 (04-30-19 @ 16:05)  HR: 82 (05-01-19 @ 08:29) (78 - 103)  BP: 141/62 (05-01-19 @ 08:29) (137/53 - 151/52)  RR: 18 (05-01-19 @ 08:29) (18 - 18)  SpO2: 96% (05-01-19 @ 08:29) (95% - 99%)  Wt(kg): --  I&O's Summary    01 May 2019 07:01  -  01 May 2019 12:43  --------------------------------------------------------  IN: 0 mL / OUT: 150 mL / NET: -150 mL        Appearance: Normal	  HEENT:   Normal oral mucosa, PERRL, EOMI	  Lymphatic: No lymphadenopathy  Cardiovascular: Normal S1 S2, No JVD, No murmurs, No edema  Respiratory: Lungs clear to auscultation	  Psychiatry: A & O x 3, Mood & affect appropriate  Gastrointestinal:  Soft, Non-tender, + BS	  Skin: No rashes, No ecchymoses, No cyanosis	  Neurologic: Non-focal  Extremities: Normal range of motion, No clubbing, cyanosis or edema  Vascular: Peripheral pulses palpable 2+ bilaterally    TELEMETRY: 	    ECG:  	  RADIOLOGY:  OTHER: 	  	  LABS:	 	    CARDIAC MARKERS:                                8.9    15.74 )-----------( 224      ( 01 May 2019 05:40 )             30.3     05-01    133<L>  |  96<L>  |  21  ----------------------------<  107<H>  3.3<L>   |  26  |  0.79    Ca    9.0      01 May 2019 05:40  Phos  3.8     05-01  Mg     1.7     05-01      proBNP:   Lipid Profile:   HgA1c:   TSH:

## 2019-05-01 NOTE — PROGRESS NOTE ADULT - PROBLEM SELECTOR PROBLEM 3
History of COPD

## 2019-05-01 NOTE — PROGRESS NOTE ADULT - PROBLEM SELECTOR PROBLEM 5
Hepatic lesion

## 2019-05-01 NOTE — PROGRESS NOTE ADULT - REASON FOR ADMISSION
colon ca copd high bp
colon ca copd hypertension
colon cancer Copd hypertension
colon cancer anemia intestiunal obstruction
colon cancer copd high bp
colon cancer copd hypertension anemia
colon cancer hypertension Copd
ca colon hypertension copd
colon ca copd hypertension

## 2019-05-01 NOTE — PROGRESS NOTE ADULT - PROVIDER SPECIALTY LIST ADULT
Anesthesia
Gastroenterology
Internal Medicine
Pain Medicine
Surgery
Gastroenterology

## 2019-05-01 NOTE — PROGRESS NOTE ADULT - ASSESSMENT
89 y/o female with colon cancer s/p extended right colectomy with primary anastomosis and microwave ablation of liver lesion     - pain control  diet as per sx  oob  dvt proph  copd stable  htn stable  resumed meds   hyponatremia improved  repeat bmp improved  hypokalemia  supp k + prn  hypomag  supp mg prn  analgesics  pt  ppi  d/c as per sx

## 2019-05-01 NOTE — PROGRESS NOTE ADULT - ASSESSMENT
89F with colon cancer s/p extended right colectomy with primary stapled anastomosis and microwave ablation of liver lesion (4/23)    - pain control  - regular diet  - lovenox for DVT ppx  - OOB and ambulating as tolerating  - PT-->home with home PT and rolling walker  - discharge planning with lovenox (after lovenox teaching)    D Team  [id number]

## 2019-05-01 NOTE — PROGRESS NOTE ADULT - PROBLEM SELECTOR PLAN 5
obstructing mass right colon day 1 post op dvt prophylaxis surg protocol
obstructing mass right colon day 2 post op dvt prophylaxis surg protocol  oob with assistance pain control await gi function return
obstructing mass right colon day 3 post op dvt prophylaxis surg protocol  oob with assistance pain control advance diet as tolerated   to go home with therapy
obstructing mass right colon day 3 post op dvt prophylaxis surg protocol  oob with assistance pain control advance diet as tolerated   to go home with therapy
obstructing mass right colon day 5 post op dvt prophylaxis surg protocol  oob with assistance pain control advance diet as tolerated   to go home with therapy
obstructing mass right colon day 5 post op dvt prophylaxis surg protocol  oob with assistance pain control advance diet as tolerated   to go home with therapy
obstructing mass right colon for surgery today
obstructing mass right colon day 6 post op dvt prophylaxis surg protocol  oob with assistance pain control advance diet as tolerated   to go home with therapy
obstructing mass right colon day 8 post op dvt prophylaxis surg protocol  oob with assistance pain control advance diet as tolerated   to go home with therapy

## 2019-05-01 NOTE — PROGRESS NOTE ADULT - PROBLEM SELECTOR PLAN 1
iron deficiency anemia status post right hemicolectomy  and microablation liver lesion day 4 path to follow cbc lytes daily follow as outpatient
iron deficiency anemia  for colon resection today  right colon
iron deficiency anemia status post right hemicolectomy  and microablation liver lesion day 4 path to follow cbc lytes daily follow as outpatient
iron deficiency anemia status post right hemicolectomy  and microablation liver lesion day 6 path to follow cbc lytes daily follow as outpatient
iron deficiency anemia status post right hemicolectomy day 1 path to follow cbc lytes daily
iron deficiency anemia status post right hemicolectomy day 2 path to follow cbc lytes daily
iron deficiency anemia status post right hemicolectomy day 3 path to follow cbc lytes daily follow as outpatient
iron deficiency anemia status post right hemicolectomy  and microablation liver lesion day 6 path to follow cbc lytes daily follow as outpatient
iron deficiency anemia status post right hemicolectomy  and microablation liver lesion day 8 path to follow cbc lytes daily follow as outpatient

## 2019-05-01 NOTE — PROGRESS NOTE ADULT - SUBJECTIVE AND OBJECTIVE BOX
Subjective: Patient is a 89y old  Female  with hypertension Copd hyperlipidemia who presents with a chief complaint weight loss anemia i performed a colonoscopy  found iron deficency anemia obstructing tumor hepatic flexure day 8 post op with microablation of Liver   long discussion with patient wants to go home with therapy at home re Deborah MEDICAL & SURGICAL HISTORY:  Anemia  Hearing deficit, bilateral  Hepatic lesion  Disease of intestine  HTN (hypertension)  Hyperlipidemia  Glaucoma  COPD (chronic obstructive pulmonary disease)  H/O blepharoplasty: BILATERAL  S/P eye surgery: s/p corneal transplant, right  Bilateral cataracts      Allergies    No Known Allergies    Intolerances        MEDICATIONS  (STANDING):  lactated ringers. 1000 milliLiter(s) (30 mL/Hr) IV Continuous <Continuous>  sodium chloride 0.9% lock flush 3 milliLiter(s) IV Push every 8 hours    MEDICATIONS  (PRN):      CONSTITUTIONAL: No fever,20lb some  fatigue  EYES: No eye pain, visual disturbances, or discharge  ENMT:  No difficulty hearing, tinnitus, vertigo; No sinus or throat pain  NECK: No pain or stiffness  BREASTS: No pain, masses, or nipple discharge  RESPIRATORY: No cough, wheezing, chills or hemoptysis; No shortness of breath  CARDIOVASCULAR: No chest pain, palpitations, dizziness, or leg swelling  GASTROINTESTINAL: No abdominal or epigastric pain. No nausea, vomiting, or hematemesis; No diarrhea or constipation. No melena or hematochezia.  GENITOURINARY: No dysuria, frequency, hematuria, or incontinence  NEUROLOGICAL: No headaches, memory loss, loss of strength, numbness, or tremors  SKIN: No itching, burning, rashes, or lesions   LYMPH NODES: No enlarged glands  ENDOCRINE: No heat or cold intolerance; No hair loss  MUSCULOSKELETAL: No joint pain or swelling; No muscle, back, or extremity pain  PSYCHIATRIC: No depression, anxiety, mood swings, or difficulty sleeping  HEME/LYMPH: No easy bruising, or bleeding gums  ALLERY AND IMMUNOLOGIC: No hives or eczema      PHYSICAL EXAM:    GENERAL: Comfortable, no acute distress pale HEAD:  Normocephalic, atraumatic afebrile vs stable    afebrile   HEENT: Moist mucous membranes  NECK: Supple, No JVD  NERVOUS SYSTEM:  Alert & Oriented X3, Motor Strength 5/5 B/L upper and lower extremities  CHEST/LUNG: Clear to auscultation bilaterally  HEART: Regular rate and rhythm  ABDOMEN: Soft, Nontender,distended,  wound dressed  GENITOURINARY: Voiding, no palpable bladder  EXTREMITIES:   No clubbing, cyanosis, or edema  MUSCULOSKELTAL- No muscle tenderness, no joint tenderness  SKIN-no rash  Complete Blood Count (04.30.19 @ 16:44)    WBC Count: 17.07 K/uL    RBC Count: 4.73 M/uL    Hemoglobin: 9.2 g/dL    Hematocrit: 31.1 %    Mean Cell Volume: 65.8 fL    Mean Cell Hemoglobin: 19.5 pg    Mean Cell Hemoglobin Conc: 29.6 %    Red Cell Distrib Width: 22.7 %    Platelet Count - Automated: 209 K/uL    MPV: Test not performed fl    Nucleated RBC #: 0 K/uL    Basic Metabolic Panel w/Mg &amp; Inorg Phos (04.30.19 @ 16:44)    eGFR if Non : 65: The units for eGFR are ml/min/1.73m2 (normalized body  surface area). The eGFR is calculated from a serum  creatinine using the CKD-EPI equation. Other variables  required for calculation are race, age and sex. Among  patients with chronic kidney disease (CKD), the eGFR is  useful in determining the stage of disease according to  KDOQI CKD classification. All eGFR results are reported  numerically with the following interpretation.    GFR  (ml/min/1.73 m2)          W/KIDNEY DAMAGE    W/O KIDNEY DMG  ==========================================================  >= 90.......................Stage 1..............Normal  60-89.......................Stage 2...........Decreased GFR  30-59.......................Stage 3..............Stage 3  15-29.......................Stage 4..............Stage 4  < 15........................Stage 5..............Stage 5    Each stage of CKD assumes that the associated GFR level  has been in effect for at least 3 months. Determination of  stages one and two (with eGFR > 59ml/min/m2) requires  estimation of kidney damage for at least 3 months as  defined by structural or functional abnormalities.    Limitations: All estimates of GFR will be less accurate  for patients at extremes of muscle mass (including but  not limited to frail elderly, critically ill, or cancer  patients), those with unusual diets, and those with  conditions associated with reduced secretion or  extrarenal elimination of creatinine. The eGFR equation  is not recommended for use in patients with unstable  creatinine levels. mL/min    Calcium, Total Serum: 9.3 mg/dL    Phosphorus Level, Serum: 2.6 mg/dL    eGFR if : 75 mL/min    Sodium, Serum: 134 mmol/L    Potassium, Serum: 3.5 mmol/L    Chloride, Serum: 95 mmol/L    Carbon Dioxide, Serum: 26 mmol/L    Anion Gap, Serum: 13 mmo/L    Blood Urea Nitrogen, Serum: 17 mg/dL    Creatinine, Serum: 0.80 mg/dL    Glucose, Serum: 117 mg/dL    Magnesium, Serum: 1.8 mg/dL

## 2019-05-01 NOTE — PROGRESS NOTE ADULT - PROBLEM SELECTOR PLAN 2
monitor I and o  bp meds
monitor I and o  bp meds has low k replace aND MONITOR
monitor I and o  bp meds has low k replace aND MONITOR

## 2019-05-08 ENCOUNTER — LABORATORY RESULT (OUTPATIENT)
Age: 84
End: 2019-05-08

## 2019-05-08 ENCOUNTER — OUTPATIENT (OUTPATIENT)
Dept: OUTPATIENT SERVICES | Facility: HOSPITAL | Age: 84
LOS: 1 days | Discharge: HOME | End: 2019-05-08

## 2019-05-08 ENCOUNTER — APPOINTMENT (OUTPATIENT)
Dept: SURGICAL ONCOLOGY | Facility: CLINIC | Age: 84
End: 2019-05-08
Payer: MEDICARE

## 2019-05-08 ENCOUNTER — APPOINTMENT (OUTPATIENT)
Age: 84
End: 2019-05-08

## 2019-05-08 VITALS
DIASTOLIC BLOOD PRESSURE: 70 MMHG | BODY MASS INDEX: 23.74 KG/M2 | SYSTOLIC BLOOD PRESSURE: 135 MMHG | HEIGHT: 62 IN | WEIGHT: 129 LBS | HEART RATE: 109 BPM

## 2019-05-08 DIAGNOSIS — Z98.89 OTHER SPECIFIED POSTPROCEDURAL STATES: Chronic | ICD-10-CM

## 2019-05-08 DIAGNOSIS — C18.9 MALIGNANT NEOPLASM OF COLON, UNSPECIFIED: ICD-10-CM

## 2019-05-08 PROCEDURE — 99024 POSTOP FOLLOW-UP VISIT: CPT

## 2019-05-08 NOTE — REASON FOR VISIT
[Post-Op] : a post-op for [FreeTextEntry2] : status post extended right colectomy and excision of liver lesion on 4/23/19

## 2019-05-08 NOTE — PHYSICAL EXAM
[Normal] : well developed, well nourished, in no acute distress [de-identified] : Vertical midline incision healing well with no evidence of infection.

## 2019-05-08 NOTE — CONSULT LETTER
[Dear  ___] : Dear  [unfilled], [Please see my note below.] : Please see my note below. [Sincerely,] : Sincerely, [Consult Letter:] : I had the pleasure of evaluating your patient, [unfilled]. [DrXavi  ___] : Dr. LABOY [DrXavi ___] : Dr. LABOY [FreeTextEntry2] : Anton Saul [FreeTextEntry1] : 89 year old female presents today for an initial postop visit, status post extended right colectomy and excision of liver lesion on 4/23/19.  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  Final pathology of the liver lesion was benign.\par \par Today she is feeling well.  She is tolerating diet without nausea/vomiting, moving her bowels without difficulty and denies fever, chills or abdominal pain. \par \par Previous pertinent history is as follows:\par \par She was found to be anemic and was referred for a CT of the abdomen and pelvis on 3/21/19 which revealed at least 6 cm long segment of ascending colon extending to the hepatic flexure with wall thickening and luminal narrowing concerning for mass with extracolonic spread.  A right hepatic lesion and sclerotic area in the right iliac bone may represent metastatic disease. There was gallstone with possible thickening at the fundus which may be further evaluated with ultrasound. \par \par On 4/4/19 Dr. Abhijit Solitario performed a colonoscopy which revealed a infiltrative partially obstructing mass at the hepatic flexure at least 5 cm in length.  The mass was malignant appearing.  Pathology was consistent with adenocarcinoma.   \par \par Her past medical history includes COPD, HTN and HLD. She reports dyspnea on exertion (able to climb a flight of stairs most of the time) but no dyspnea at rest.  Prior surgeries include a unilateral corneal transplant.  She has a family history of colon cancer involving her father diagnosed in his 70's.  Delmy has never had a screening colonoscopy prior to this point. \par \par A&P:\par Doing well s/p extended right colectomy on 4/23/19.\par Medical oncology consultation. \par \par PCP: Dr. Jose Armando Natarajan\par Referring MD/Vascular: Dr. Anton Saul\par GI: Dr. Abhijit Solitario [FreeTextEntry3] : Salbador Odonnell MD, FACS, FASCRS\par , Department of Surgery\par Director of the [facility]\par , Minimally Invasive/Robotic Cancer Surgery, Central & Eastern Divisions\par Division of Surgical Oncology \par \par \par \par Salbador Odonnell MD, FACS, FACS, FASCRS\par , Department of Surgery\par Director of the [facility]\par , Minimally Invasive/Robotic Cancer Surgery, Central & Eastern Divisions\par Division of Surgical Oncology\par \par

## 2019-05-08 NOTE — ASSESSMENT
[FreeTextEntry1] : Doing well s/p extended right colectomy on 4/23/19.\par Medical oncology consultation.

## 2019-05-14 ENCOUNTER — LABORATORY RESULT (OUTPATIENT)
Age: 84
End: 2019-05-14

## 2019-05-14 ENCOUNTER — RESULT REVIEW (OUTPATIENT)
Age: 84
End: 2019-05-14

## 2019-05-14 ENCOUNTER — APPOINTMENT (OUTPATIENT)
Dept: HEMATOLOGY ONCOLOGY | Facility: CLINIC | Age: 84
End: 2019-05-14
Payer: MEDICARE

## 2019-05-14 VITALS
RESPIRATION RATE: 15 BRPM | WEIGHT: 126.52 LBS | HEART RATE: 111 BPM | BODY MASS INDEX: 23.89 KG/M2 | DIASTOLIC BLOOD PRESSURE: 78 MMHG | TEMPERATURE: 97.8 F | HEIGHT: 61.02 IN | OXYGEN SATURATION: 99 % | SYSTOLIC BLOOD PRESSURE: 157 MMHG

## 2019-05-14 DIAGNOSIS — Z87.09 PERSONAL HISTORY OF OTHER DISEASES OF THE RESPIRATORY SYSTEM: ICD-10-CM

## 2019-05-14 DIAGNOSIS — Z86.79 PERSONAL HISTORY OF OTHER DISEASES OF THE CIRCULATORY SYSTEM: ICD-10-CM

## 2019-05-14 DIAGNOSIS — Z86.39 PERSONAL HISTORY OF OTHER ENDOCRINE, NUTRITIONAL AND METABOLIC DISEASE: ICD-10-CM

## 2019-05-14 DIAGNOSIS — Z87.891 PERSONAL HISTORY OF NICOTINE DEPENDENCE: ICD-10-CM

## 2019-05-14 DIAGNOSIS — Z98.890 OTHER SPECIFIED POSTPROCEDURAL STATES: ICD-10-CM

## 2019-05-14 PROCEDURE — 99205 OFFICE O/P NEW HI 60 MIN: CPT

## 2019-05-15 LAB
BASOPHILS # BLD AUTO: 0 K/UL — SIGNIFICANT CHANGE UP (ref 0–0.2)
BASOPHILS NFR BLD AUTO: 0.2 % — SIGNIFICANT CHANGE UP (ref 0–2)
EOSINOPHIL # BLD AUTO: 0.1 K/UL — SIGNIFICANT CHANGE UP (ref 0–0.5)
EOSINOPHIL NFR BLD AUTO: 0.6 % — SIGNIFICANT CHANGE UP (ref 0–6)
LYMPHOCYTES # BLD AUTO: 17.5 % — SIGNIFICANT CHANGE UP (ref 13–44)
LYMPHOCYTES # BLD AUTO: 2 K/UL — SIGNIFICANT CHANGE UP (ref 1–3.3)
MONOCYTES # BLD AUTO: 1 K/UL — HIGH (ref 0–0.9)
MONOCYTES NFR BLD AUTO: 9.4 % — SIGNIFICANT CHANGE UP (ref 2–14)
NEUTROPHILS # BLD AUTO: 8.1 K/UL — HIGH (ref 1.8–7.4)
NEUTROPHILS NFR BLD AUTO: 72.3 % — SIGNIFICANT CHANGE UP (ref 43–77)

## 2019-05-15 RX ORDER — POTASSIUM CHLORIDE 1500 MG/1
20 TABLET, FILM COATED, EXTENDED RELEASE ORAL
Qty: 4 | Refills: 0 | Status: DISCONTINUED | COMMUNITY
Start: 2019-04-17 | End: 2019-05-15

## 2019-05-15 RX ORDER — METRONIDAZOLE 250 MG/1
250 TABLET ORAL
Qty: 3 | Refills: 0 | Status: DISCONTINUED | COMMUNITY
Start: 2019-04-17 | End: 2019-05-15

## 2019-05-15 RX ORDER — NEOMYCIN SULFATE 500 MG/1
500 TABLET ORAL
Qty: 6 | Refills: 0 | Status: DISCONTINUED | COMMUNITY
Start: 2019-04-17 | End: 2019-05-15

## 2019-05-20 NOTE — ASSESSMENT
[FreeTextEntry1] : 89yo F with dMMR, BRAF mutated Stage IIb colon adenocarcinoma.\par \par Patients may be offered chemotherapy after surgery (called adjuvant chemotherapy) for stage 2 colon cancer to reduce the risk of recurrence. Chemotherapy can be started 4–8 weeks after surgery and is usually given for 6 months.\par \par Most people with stage 2 colon cancer will not need chemotherapy. Chemotherapy is only offered when there is a high risk that the colon cancer will recur (come back) and at least one of the following high-risk features is present:\par • The tumour is T4 (stage 2B or 2C).\par • Less than 12 lymph nodes were removed or could be assessed.\par • The tumour has grown into nearby lymph nodes or blood vessels, the membrane that covers and supports most of the organs in the abdomen (called the visceral peritoneum) or the space surrounding nerves (called perineural invasion).\par • The tumour is high grade, or poorly differentiated.\par • A blockage in the intestine (called bowel obstruction).\par • A tear or hole in the intestine (called bowel perforation).\par • Not enough healthy tissue was removed along with the tumour.\par • Cancer cells are found in the tissue removed along with the tumour (called positive surgical margins).\par \par \par The chemotherapy drugs that may be given for stage 2 colon cancer include:\par • capecitabine (Xeloda)\par • 5-fluorouracil (Adrucil, 5-FU) with leucovorin (folinic acid)\par • FOLFOX – leucovorin (folinic acid), 5-fluorouracil (Adrucil, 5-FU) and oxaliplatin (Eloxatin)\par • CAPOX (also called XELOX) – capecitabine (Xeloda) and oxaliplatin (Eloxatin)\par \par Microsatellite instability (MSI) is a change to the genetic material, or DNA, in a cell. Research has shown that tumours with high levels of microsatellite instability (MSI-H) have a favourable prognosis and do not benefit from chemotherapy with 5-fluorouracil (Adrucil, 5-FU). As a result, people with stage 2 colon cancer and high levels of MSI in the tumour are not usually offered chemotherapy. More research is needed to find out if doctors can use the presence of MSI alone to decide to offer chemotherapy for stage 2 colon cancer.\par \par Doctors may offer FOLFOX or CAPOX when many high-risk features are present. But many oncologists have questioned using oxaliplatin to treat stage 2 colon cancer. This is because research has not shown that chemotherapy combinations with oxaliplatin improve survival for stage 2 colon cancer. More research is needed before these drug combinations can become standard treatment.\par \par However, based on age and other comorbid conditions, chemotherapy may not be an optimal option. However, close monitoring per NCCN guidelines will be conducted.\par \par Patient with Microcytic Anemia. Iron Panel suggestive of iron deficiency anemia. Patient will receive iron replacement therapy and reevaluate CBC in 4 weeks from now. \par \par Pt will return after post-surgical scans.\par Labs including tumor markers were collected today as well.\par An open discussion carried out with the patient and her family.\par

## 2019-05-20 NOTE — REASON FOR VISIT
[Initial Consultation] : an initial consultation [Family Member] : family member [FreeTextEntry2] : Stage II CRC

## 2019-05-20 NOTE — PHYSICAL EXAM
[Thin] : thin [Normal] : affect appropriate [Capable of only limited self care, confined to bed or chair more than 50% of waking hours] : Status 3- Capable of only limited self care, confined to bed or chair more than 50% of waking hours [de-identified] : laparotomy midline scar

## 2019-05-24 ENCOUNTER — APPOINTMENT (OUTPATIENT)
Dept: NUCLEAR MEDICINE | Facility: IMAGING CENTER | Age: 84
End: 2019-05-24

## 2019-06-11 ENCOUNTER — APPOINTMENT (OUTPATIENT)
Dept: HEMATOLOGY ONCOLOGY | Facility: CLINIC | Age: 84
End: 2019-06-11

## 2019-06-25 LAB
ALBUMIN SERPL ELPH-MCNC: 3.7 G/DL
ALP BLD-CCNC: 96 U/L
ALT SERPL-CCNC: 17 U/L
ANION GAP SERPL CALC-SCNC: 17 MMOL/L
AST SERPL-CCNC: 15 U/L
BILIRUB SERPL-MCNC: 0.5 MG/DL
BUN SERPL-MCNC: 29 MG/DL
CALCIUM SERPL-MCNC: 10.2 MG/DL
CEA SERPL-MCNC: 5.4 NG/ML
CHLORIDE SERPL-SCNC: 105 MMOL/L
CO2 SERPL-SCNC: 24 MMOL/L
CREAT SERPL-MCNC: 0.73 MG/DL
GLUCOSE SERPL-MCNC: 104 MG/DL
POTASSIUM SERPL-SCNC: 4.4 MMOL/L
PROT SERPL-MCNC: 6.3 G/DL
SODIUM SERPL-SCNC: 146 MMOL/L

## 2019-09-11 ENCOUNTER — APPOINTMENT (OUTPATIENT)
Dept: SURGICAL ONCOLOGY | Facility: CLINIC | Age: 84
End: 2019-09-11
Payer: MEDICARE

## 2019-09-11 VITALS
HEART RATE: 73 BPM | OXYGEN SATURATION: 90 % | SYSTOLIC BLOOD PRESSURE: 148 MMHG | BODY MASS INDEX: 24.55 KG/M2 | DIASTOLIC BLOOD PRESSURE: 77 MMHG | TEMPERATURE: 97.8 F | WEIGHT: 130 LBS | RESPIRATION RATE: 17 BRPM | HEIGHT: 61 IN

## 2019-09-11 DIAGNOSIS — C18.9 MALIGNANT NEOPLASM OF COLON, UNSPECIFIED: ICD-10-CM

## 2019-09-11 PROCEDURE — 99214 OFFICE O/P EST MOD 30 MIN: CPT

## 2019-09-11 NOTE — HISTORY OF PRESENT ILLNESS
[de-identified] : 89 year old female returns for follow up.  She is status post extended right colectomy and excision of liver lesion on 4/23/19.  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  Final pathology of the liver lesion was benign.  A comprehensive genetic testing panel was negative for any germline mutations or variants. \par \par She consulted with Dr. Justin Coker from medical oncology who recommend adjuvant chemotherapy, however, Delmy is declining any additional treatment.  CEA 5/14/19: 5.4 ng/ml.  Dr. Coker had recommended a PET/CT, however, patient is declining and further diagnostic imaging related to her diagnosis.  She is opting to follow Dr. Garcia who she has seen in the past for iron deficiency anemia and she prefers to have tumor markers drawn by her PCP. \par \par Today she is feeling well.  Her appetite is preserved and she has gained weight.  She is tolerating diet without nausea/vomiting, moving her bowels without difficulty and denies fever, chills or abdominal pain. \par \par Previous pertinent history is as follows:\par \par She was found to be anemic and was referred for a CT of the abdomen and pelvis on 3/21/19 which revealed at least 6 cm long segment of ascending colon extending to the hepatic flexure with wall thickening and luminal narrowing concerning for mass with extracolonic spread.  A right hepatic lesion and sclerotic area in the right iliac bone may represent metastatic disease. There was gallstone with possible thickening at the fundus which may be further evaluated with ultrasound. \par \par On 4/4/19 Dr. Abhijit Solitario performed a colonoscopy which revealed a infiltrative partially obstructing mass at the hepatic flexure at least 5 cm in length.  The mass was malignant appearing.  Pathology was consistent with adenocarcinoma.   \par \par Her past medical history includes COPD, HTN and HLD. She reports dyspnea on exertion (able to climb a flight of stairs most of the time) but no dyspnea at rest.  Prior surgeries include a unilateral corneal transplant.  She has a family history of colon cancer involving her father diagnosed in his 70's.  Delmy has never had a screening colonoscopy prior to this point. \par \par \par PCP: Dr. Jose Armando Natarajan\par Referring MD/Vascular: Dr. Anton Saul\par GI: Dr. Abhijit Solitario\par Med Onc: Dr. Justin Coker\par Heme Onc: Dr. Sang Harvey

## 2019-09-11 NOTE — ASSESSMENT
[FreeTextEntry1] : 89 year old female returns for follow up.  She is status post extended right colectomy and excision of liver lesion on 4/23/19.  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  Final pathology of the liver lesion was benign.  A comprehensive genetic testing panel was negative for any germline mutations or variants. \par \par She consulted with Dr. Justin Coker from medical oncology who recommend adjuvant chemotherapy, however, Delmy is declining any additional treatment.  CEA 5/14/19: 5.4 ng/ml.  Dr. Coker had recommended a PET/CT, however, patient is declining and further diagnostic imaging related to her diagnosis.  She is opting to follow Dr. Garcia who she has seen in the past for iron deficiency anemia and she prefers to have tumor markers drawn by her PCP. \par \par Today she is feeling well.  Her appetite is preserved and she has gained weight.  She is tolerating diet without nausea/vomiting, moving her bowels without difficulty and denies fever, chills or abdominal pain. \par \par Previous pertinent history is as follows:\par \par She was found to be anemic and was referred for a CT of the abdomen and pelvis on 3/21/19 which revealed at least 6 cm long segment of ascending colon extending to the hepatic flexure with wall thickening and luminal narrowing concerning for mass with extracolonic spread.  A right hepatic lesion and sclerotic area in the right iliac bone may represent metastatic disease. There was gallstone with possible thickening at the fundus which may be further evaluated with ultrasound. \par \par On 4/4/19 Dr. Abhijit Solitario performed a colonoscopy which revealed a infiltrative partially obstructing mass at the hepatic flexure at least 5 cm in length.  The mass was malignant appearing.  Pathology was consistent with adenocarcinoma.   \par \par Her past medical history includes COPD, HTN and HLD. She reports dyspnea on exertion (able to climb a flight of stairs most of the time) but no dyspnea at rest.  Prior surgeries include a unilateral corneal transplant.  She has a family history of colon cancer involving her father diagnosed in his 70's.  Delmy has never had a screening colonoscopy prior to this point. \par \par A&P:\par History of T4aN0 colon cancer s/p extended right colectomy 4/23/19.\par Adjuvant chemotherapy recommended but patient declines.\par Patient declines further imaging follow up at this time.\par Patient prefers to continue care with Dr.Gerard Harvey and PCP only at this time.\par Improving clinically.

## 2019-09-11 NOTE — CONSULT LETTER
[Consult Letter:] : I had the pleasure of evaluating your patient, [unfilled]. [Dear  ___] : Dear  [unfilled], [Please see my note below.] : Please see my note below. [Sincerely,] : Sincerely, [DrXavi  ___] : Dr. LABOY [DrXavi ___] : Dr. LABOY [FreeTextEntry2] : Anton Saul [FreeTextEntry1] : 89 year old female returns for follow up.  She is status post extended right colectomy and excision of liver lesion on 4/23/19.  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  Final pathology of the liver lesion was benign.  A comprehensive genetic testing panel was negative for any germline mutations or variants. \par \par She consulted with Dr. Justin Coker from medical oncology who recommend adjuvant chemotherapy, however, Delmy is declining any additional treatment.  CEA 5/14/19: 5.4 ng/ml.  Dr. Coker had recommended a PET/CT, however, patient is declining and further diagnostic imaging related to her diagnosis.  She is opting to follow Dr. Garcia who she has seen in the past for iron deficiency anemia and she prefers to have tumor markers drawn by her PCP. \par \par Today she is feeling well.  Her appetite is preserved and she has gained weight.  She is tolerating diet without nausea/vomiting, moving her bowels without difficulty and denies fever, chills or abdominal pain. \par \par Previous pertinent history is as follows:\par \par She was found to be anemic and was referred for a CT of the abdomen and pelvis on 3/21/19 which revealed at least 6 cm long segment of ascending colon extending to the hepatic flexure with wall thickening and luminal narrowing concerning for mass with extracolonic spread.  A right hepatic lesion and sclerotic area in the right iliac bone may represent metastatic disease. There was gallstone with possible thickening at the fundus which may be further evaluated with ultrasound. \par \par On 4/4/19 Dr. Abhijit Solitario performed a colonoscopy which revealed a infiltrative partially obstructing mass at the hepatic flexure at least 5 cm in length.  The mass was malignant appearing.  Pathology was consistent with adenocarcinoma.   \par \par Her past medical history includes COPD, HTN and HLD. She reports dyspnea on exertion (able to climb a flight of stairs most of the time) but no dyspnea at rest.  Prior surgeries include a unilateral corneal transplant.  She has a family history of colon cancer involving her father diagnosed in his 70's.  Delmy has never had a screening colonoscopy prior to this point. \par \par A&P:\par History of T4aN0 colon cancer s/p extended right colectomy 4/23/19.\par Adjuvant chemotherapy recommended but patient declines.\par Patient declines further imaging follow up at this time.\par Patient prefers to continue care with Dr. Harvey and PCP only at this time.\par Improving clinically. \par \par PCP: Dr. Jose Armando Natarajan\par Referring MD/Vascular: Dr. Anton Saul\par GI: Dr. Abhijit Solitario\par Med Onc: Dr. Justin Coker\par Heme Onc: Dr. Sang Harvey [FreeTextEntry3] : Salbador Odonnell MD, FACS, FASCRS\par , Department of Surgery\par Director of the [facility]\par , Minimally Invasive/Robotic Cancer Surgery, Central & [facility] Divisions\par Division of Surgical Oncology \par \par \par \par Salbador Odonnell MD, FACS, FACS, FASCRS\par , Department of Surgery\par Director of the [facility]\par , Minimally Invasive/Robotic Cancer Surgery, Central & Eastern Divisions\par Division of Surgical Oncology\par \par

## 2019-09-11 NOTE — PHYSICAL EXAM
[Normal] : supple, no neck mass and thyroid not enlarged [Normal Neck Lymph Nodes] : normal neck lymph nodes  [Normal Supraclavicular Lymph Nodes] : normal supraclavicular lymph nodes [Normal] : oriented to person, place and time, with appropriate affect [de-identified] : Midline incision well-healed with no mass or hernia.

## 2021-05-28 NOTE — PHYSICAL THERAPY INITIAL EVALUATION ADULT - ASSISTIVE DEVICE, REHAB EVAL
1. We discussed sleepiness and possible sleep disorder.  Will consider evaluation if symptoms persist or progress.      2. Decrease escitalopram to 10 mg po daily and see how symptoms  Go.    3. Follow up in 4 months  
bed rails

## 2022-07-11 NOTE — PATIENT PROFILE ADULT - VISION (WITH CORRECTIVE LENSES IF THE PATIENT USUALLY WEARS THEM):
Partially impaired: cannot see medication labels or newsprint, but can see obstacles in path, and the surrounding layout; can count fingers at arm's length/readers no abdominal pain, no bloating, no constipation, no diarrhea, no nausea and no vomiting.

## 2023-09-22 NOTE — PHYSICAL THERAPY INITIAL EVALUATION ADULT - REFERRING PHYSICIAN, REHAB EVAL
Blas Siu Dutasteride Pregnancy And Lactation Text: This medication is absolutely contraindicated in women, especially during pregnancy and breast feeding. Feminization of male fetuses is possible if taking while pregnant.

## 2024-05-20 NOTE — H&P PST ADULT - LYMPHATICS COMMENTS
Please continue to track your menstrual cycles  Please buy ovulation kits and start use on day 12 until you get a positive reading   Once your kit is positive for ovulation, please have LOTS of sex for the next 3 days     no lymphadenopathy palpated